# Patient Record
Sex: FEMALE | Race: WHITE | Employment: FULL TIME | URBAN - METROPOLITAN AREA
[De-identification: names, ages, dates, MRNs, and addresses within clinical notes are randomized per-mention and may not be internally consistent; named-entity substitution may affect disease eponyms.]

---

## 2017-12-18 ENCOUNTER — TELEPHONE (OUTPATIENT)
Dept: PULMONOLOGY | Age: 42
End: 2017-12-18

## 2017-12-18 ENCOUNTER — OFFICE VISIT (OUTPATIENT)
Dept: PULMONOLOGY | Age: 42
End: 2017-12-18

## 2017-12-18 VITALS
TEMPERATURE: 98.5 F | DIASTOLIC BLOOD PRESSURE: 84 MMHG | WEIGHT: 217 LBS | HEIGHT: 64 IN | RESPIRATION RATE: 18 BRPM | BODY MASS INDEX: 37.05 KG/M2 | HEART RATE: 101 BPM | SYSTOLIC BLOOD PRESSURE: 132 MMHG | OXYGEN SATURATION: 98 %

## 2017-12-18 DIAGNOSIS — R59.0 MEDIASTINAL ADENOPATHY: ICD-10-CM

## 2017-12-18 DIAGNOSIS — R06.02 SHORTNESS OF BREATH: ICD-10-CM

## 2017-12-18 DIAGNOSIS — R91.8 LUNG NODULES: ICD-10-CM

## 2017-12-18 PROCEDURE — 99245 OFF/OP CONSLTJ NEW/EST HI 55: CPT | Performed by: INTERNAL MEDICINE

## 2017-12-18 NOTE — ASSESSMENT & PLAN NOTE
The etiology is unclear. DDX includes granulomatous disease such as histoplasmosis, sarcoidosis or malignancy.   - Plan for bronchoscopy be as a first step  - And urine histo antigen and fungal serologies at next appointment

## 2017-12-18 NOTE — PROGRESS NOTES
No synovitis or joint deformity. No clubbing. Neuro: Cranial nerves are grossly intact. Moving all extremities. Motor and sensation grossly intact. Psych: Oriented x 3. No anxiety or agitation. DATA:      LABS:      No PFTs available for review today. IMAGING:      I personally reviewed and interpreted the following today in the office :       Chest CT(dated 12/4/17): Mediastinum: Multiple enlarged lymph nodes are identified.  The largest  conglomerate is in the right paratracheal space measuring 2.2 x 1.8 cm (image  number 36).  No axillary lymphadenopathy is identified.  Bilateral breast  prostheses noted. Lungs/pleura: Multiple suspicious-appearing nodules are identified within the  lungs bilaterally.  The largest is seen within the right lower lobe measuring  2.0 x 1.5 cm (image number 78). Another example nodule is in the medial aspect of the right lower lobe  measuring 6 x 6 mm (image number 63). Another example nodule is seen within the left lower lobe medially measuring  8 x 7 mm (image number 82). Numerous smaller nodules are detected, none of which appear to contain  calcification. ASSESSMENT AND PLAN:      Lung nodules  The etiology is unclear. DDX includes granulomatous disease such as histoplasmosis, sarcoidosis or malignancy. - Plan for bronchoscopy be as a first step  - And urine histo antigen and fungal serologies at next appointment    Mediastinal adenopathy  The etiology of the patient's adenopathy is unknown. DDx includes fungus, sarcoidosis or malignancy. We will plan for bronchoscopy with endobronchial ultrasound and fluoroscopy for diagnosis. Risk (including but not limited to bleeding, infection, collapsed lung and even death), benefit and options were explained to the patient. She wishes to proceed. Shortness of breath  MIld at present. - consider Pfts in future        The information above included the review and summarization of old records.   The history was obtained from these old records and from the patient directly. Risks, benefits and alternatives to the management plan were discussed with the patient.

## 2017-12-18 NOTE — ASSESSMENT & PLAN NOTE
The etiology of the patient's adenopathy is unknown. DDx includes fungus, sarcoidosis or malignancy. We will plan for bronchoscopy with endobronchial ultrasound and fluoroscopy for diagnosis. Risk (including but not limited to bleeding, infection, collapsed lung and even death), benefit and options were explained to the patient. She wishes to proceed.

## 2017-12-19 ENCOUNTER — HOSPITAL ENCOUNTER (OUTPATIENT)
Dept: SURGERY | Age: 42
Discharge: HOME | End: 2017-12-19
Admitting: INTERNAL MEDICINE

## 2017-12-19 VITALS
SYSTOLIC BLOOD PRESSURE: 120 MMHG | TEMPERATURE: 97.4 F | OXYGEN SATURATION: 97 % | HEART RATE: 73 BPM | DIASTOLIC BLOOD PRESSURE: 72 MMHG | HEIGHT: 64 IN | RESPIRATION RATE: 17 BRPM | BODY MASS INDEX: 36.7 KG/M2 | WEIGHT: 215 LBS

## 2017-12-19 LAB
GLUCOSE BLD-MCNC: 123 MG/DL (ref 70–99)
PERFORMED ON: ABNORMAL
PREGNANCY, URINE: NEGATIVE

## 2017-12-19 RX ORDER — HYDRALAZINE HYDROCHLORIDE 20 MG/ML
5 INJECTION INTRAMUSCULAR; INTRAVENOUS EVERY 10 MIN PRN
Status: DISCONTINUED | OUTPATIENT
Start: 2017-12-19 | End: 2017-12-20 | Stop reason: HOSPADM

## 2017-12-19 RX ORDER — PROMETHAZINE HYDROCHLORIDE 25 MG/ML
6.25 INJECTION, SOLUTION INTRAMUSCULAR; INTRAVENOUS
Status: ACTIVE | OUTPATIENT
Start: 2017-12-19 | End: 2017-12-19

## 2017-12-19 RX ORDER — HYDROMORPHONE HCL 110MG/55ML
0.5 PATIENT CONTROLLED ANALGESIA SYRINGE INTRAVENOUS EVERY 5 MIN PRN
Status: DISCONTINUED | OUTPATIENT
Start: 2017-12-19 | End: 2017-12-20 | Stop reason: HOSPADM

## 2017-12-19 RX ORDER — OXYCODONE HYDROCHLORIDE AND ACETAMINOPHEN 5; 325 MG/1; MG/1
1 TABLET ORAL PRN
Status: ACTIVE | OUTPATIENT
Start: 2017-12-19 | End: 2017-12-19

## 2017-12-19 RX ORDER — SODIUM CHLORIDE, SODIUM LACTATE, POTASSIUM CHLORIDE, CALCIUM CHLORIDE 600; 310; 30; 20 MG/100ML; MG/100ML; MG/100ML; MG/100ML
INJECTION, SOLUTION INTRAVENOUS ONCE
Status: DISCONTINUED | OUTPATIENT
Start: 2017-12-19 | End: 2017-12-20 | Stop reason: HOSPADM

## 2017-12-19 RX ORDER — ONDANSETRON 2 MG/ML
4 INJECTION INTRAMUSCULAR; INTRAVENOUS
Status: ACTIVE | OUTPATIENT
Start: 2017-12-19 | End: 2017-12-19

## 2017-12-19 RX ORDER — DIPHENHYDRAMINE HYDROCHLORIDE 50 MG/ML
12.5 INJECTION INTRAMUSCULAR; INTRAVENOUS
Status: ACTIVE | OUTPATIENT
Start: 2017-12-19 | End: 2017-12-19

## 2017-12-19 RX ORDER — OXYCODONE HYDROCHLORIDE AND ACETAMINOPHEN 5; 325 MG/1; MG/1
2 TABLET ORAL PRN
Status: ACTIVE | OUTPATIENT
Start: 2017-12-19 | End: 2017-12-19

## 2017-12-19 RX ORDER — MEPERIDINE HYDROCHLORIDE 25 MG/ML
12.5 INJECTION INTRAMUSCULAR; INTRAVENOUS; SUBCUTANEOUS EVERY 5 MIN PRN
Status: DISCONTINUED | OUTPATIENT
Start: 2017-12-19 | End: 2017-12-20 | Stop reason: HOSPADM

## 2017-12-19 RX ORDER — HYDROMORPHONE HCL 110MG/55ML
0.25 PATIENT CONTROLLED ANALGESIA SYRINGE INTRAVENOUS EVERY 5 MIN PRN
Status: DISCONTINUED | OUTPATIENT
Start: 2017-12-19 | End: 2017-12-20 | Stop reason: HOSPADM

## 2017-12-19 RX ORDER — LABETALOL HYDROCHLORIDE 5 MG/ML
5 INJECTION, SOLUTION INTRAVENOUS EVERY 10 MIN PRN
Status: DISCONTINUED | OUTPATIENT
Start: 2017-12-19 | End: 2017-12-20 | Stop reason: HOSPADM

## 2017-12-19 ASSESSMENT — PAIN - FUNCTIONAL ASSESSMENT: PAIN_FUNCTIONAL_ASSESSMENT: 0-10

## 2017-12-19 ASSESSMENT — ENCOUNTER SYMPTOMS: SHORTNESS OF BREATH: 1

## 2017-12-19 NOTE — PROGRESS NOTES
Recovery completed. Discharge instructions given to family. Pt and family verbalize understanding. Pt in wheelchair leaving recovery in stable condition for private auto.

## 2017-12-19 NOTE — H&P
There have been no significant changes since office visit yesterday. Nursing History and Physical reviewed and agreed upon. Allergies and medications have been reviewed. HENT: Airway patent and reviewed; Mallampatti 1  Cardiovascular: Normal rate, regular rhythm, normal heart sounds. Pulmonary/Chest: No wheezes. No rhonchi. No rales. Abdominal: Soft. Bowel sounds are normal. No distension. ASA CLASS     II. Mild systemic disease     Sedation plan:      Sedation per anesthesia     Post Procedure Plan   Return to same level of care   ______________________     The risks and benefits as well as alternatives to the procedure have been discussed with the patient. The patient understands and agrees to proceed.

## 2017-12-19 NOTE — ANESTHESIA PRE-OP
Department of Anesthesiology  Preprocedure Note       Name:  Anoop Chatman   Age:  38 y.o.  :  1975                                          MRN:  [id number]         Date:  2017      Surgeon:    Procedure:    Medications prior to admission:   Prior to Admission medications    Medication Sig Start Date End Date Taking? Authorizing Provider   lisdexamfetamine (VYVANSE) 70 MG capsule Take 70 mg by mouth every morning . Yes Historical Provider, MD   metFORMIN (GLUCOPHAGE) 1000 MG tablet Take 1,000 mg by mouth 2 times daily (with meals). Yes Historical Provider, MD   escitalopram (LEXAPRO) 10 MG tablet Take 10 mg by mouth daily. Yes Historical Provider, MD       Current medications:    Current Outpatient Prescriptions   Medication Sig Dispense Refill    lisdexamfetamine (VYVANSE) 70 MG capsule Take 70 mg by mouth every morning .  metFORMIN (GLUCOPHAGE) 1000 MG tablet Take 1,000 mg by mouth 2 times daily (with meals).  escitalopram (LEXAPRO) 10 MG tablet Take 10 mg by mouth daily. 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    Moderate ankle sprain S93.409A    Lung nodules R91.8    Mediastinal adenopathy R59.0    Shortness of breath R06.02       Past Medical History:        Diagnosis Date    Diabetes mellitus ([facility] [address]        Past Surgical History:        Procedure Laterality Date    BREAST SURGERY  10/06    bilat breast augmentation     SECTION  2009    x2  and     COLONOSCOPY  14    LASIK      ERICKA  4/98    dysplasia    NASAL SEPTUM SURGERY  1991    TONSILLECTOMY AND ADENOIDECTOMY  1991    WISDOM TOOTH EXTRACTION  7/96       Social History:    Social History   Substance Use Topics    Smoking status: Former Smoker     Packs/day: 0.25     Quit date: 6/20/2010    Smokeless tobacco: Never Used    Alcohol use Yes      Comment: rarely                                Counseling given: Not Answered      Vital Signs (Current):   Vitals:    12/19/17 0808   BP: (!) 172/87   Pulse: 74   Resp: 16   Temp: 98.5 °F (36.9 °C)   SpO2: 99%   Weight: 215 lb (97.5 kg)   Height: 5' 4\" (1.626 m)                                              BP Readings from Last 3 Encounters:   12/19/17 (!) 172/87   12/18/17 132/84   12/04/17 138/88       NPO Status: Time of last liquid consumption: 2000                        Time of last solid consumption: 1900                        Date of last liquid consumption: 12/18/17                        Date of last solid food consumption: 12/18/17    BMI:   Wt Readings from Last 3 Encounters:   12/19/17 215 lb (97.5 kg)   12/18/17 217 lb (98.4 kg)   12/04/17 207 lb (93.9 kg)     Body mass index is 36.9 kg/m². Anesthesia Evaluation     history of anesthetic complications: PONV. Airway: Mallampati: II  TM distance: >3 FB     Mouth opening: > = 3 FB Dental: normal exam         Pulmonary:   (+) shortness of breath:                            ROS comment: H/o tob, lung nodules   Cardiovascular:    (+) hyperlipidemia                  Neuro/Psych:               GI/Hepatic/Renal: Neg GI/Hepatic/Renal ROS            Endo/Other:    (+) Type II DM, , .                 Abdominal:           Vascular:                                     Pre-Operative Diagnosis: PULMONARY NODULE    38 y.o.   BMI:  Body mass index is 36.9 kg/m².      Vitals:    12/19/17 0808   BP: (!) 172/87   Pulse: 74   Resp: 16   Temp: 98.5 °F (36.9 °C)   SpO2: 99%   Weight: 215 lb (97.5 kg)   Height: 5' 4\" (1.626 m)       Allergies Allergen Reactions    Morphine Itching    Vicoprofen [Hydrocodone-Ibuprofen] Other (See Comments)     Severe stomach pains        Social History   Substance Use Topics    Smoking status: Former Smoker     Packs/day: 0.25     Quit date: 6/20/2010    Smokeless tobacco: Never Used    Alcohol use Yes      Comment: rarely       LABS:    CBC  Lab Results   Component Value Date/Time    WBC 12.1 (H) 12/04/2017 05:30 PM    HGB 14.7 12/04/2017 05:30 PM    HCT 43.2 12/04/2017 05:30 PM     12/04/2017 05:30 PM     RENAL  Lab Results   Component Value Date/Time     12/04/2017 05:30 PM    K 4.4 12/04/2017 05:30 PM    CL 98 (L) 12/04/2017 05:30 PM    CO2 24 12/04/2017 05:30 PM    BUN 9 12/04/2017 05:30 PM    CREATININE 0.6 12/04/2017 05:30 PM    GLUCOSE 145 (H) 12/04/2017 05:30 PM     COAGS  No results found for: PROTIME, INR, APTT     Anesthesia Plan      general     ASA 3     (Pt agrees to risks, benefits and alternatives of GETA. Questions answered. Willing to proceed with plan.)  Induction: intravenous. Anesthetic plan and risks discussed with patient.                       Aliya Smart MD   12/19/2017

## 2017-12-19 NOTE — PROCEDURES
PROCEDURE: Fiberoptic bronchoscopy with endobronchial ultrasound-guided biopsy of lymph nodes    DESCRIPTION OF PROCEDURE: Informed consent was obtained from the patient after explaining the risks and benefits. A time out was taken. General anesthesia was kindly provided by the anesthetist.     The scope was passed with ease via the LMA. Direct visualization of the lymph nodes was obtained using endobronchial ultrasound (EBUS) guidance. A complete ultrasound lymph node exam was performed for lymph node stations 2, 4, 7, 10 and 11. The following lymph nodes were subjected to EBUS guided biopsy using standard technique and in the following order:    1.  2R (approximately 1.5 cm in short axis)  2.  4R (approximately 1.5 cm in short axis)  3.  7 (approximately 1.25 cm in short axis)  4. 10R (approximately 1 cm in short axis)    Subsequently, a standard bronchoscope was used to perform a complete airway inspection. There were no endobronchial masses identified, the mucosa was somewhat erythematous and inflamed appearing. 1.  Washings were obtained from throughout the airways. 2.  Brushings were obtained in the region RLL posterior subsegment. 3.  A bronchoalveolar lavage was obtained from the RLL. The patient tolerated the procedure well. Recovery will be per the anesthesia team.   Estimated blood loss <5ml. FOLLOW UP:  is with me in the office. Patient is instructed to call with concerns. In the event of severe symptoms or if the patient is unable to reach my office, instructions are given to proceed to the emergency department.

## 2017-12-21 LAB
CULTURE, RESPIRATORY: NORMAL
CULTURE, RESPIRATORY: NORMAL
GRAM STAIN RESULT: NORMAL
GRAM STAIN RESULT: NORMAL

## 2018-01-05 ENCOUNTER — OFFICE VISIT (OUTPATIENT)
Dept: PULMONOLOGY | Age: 43
End: 2018-01-05

## 2018-01-05 ENCOUNTER — TELEPHONE (OUTPATIENT)
Dept: DERMATOLOGY | Age: 43
End: 2018-01-05

## 2018-01-05 ENCOUNTER — HOSPITAL ENCOUNTER (OUTPATIENT)
Dept: OTHER | Age: 43
Discharge: HOME | End: 2018-01-05
Attending: INTERNAL MEDICINE | Admitting: INTERNAL MEDICINE

## 2018-01-05 VITALS
SYSTOLIC BLOOD PRESSURE: 132 MMHG | WEIGHT: 215 LBS | TEMPERATURE: 98 F | RESPIRATION RATE: 18 BRPM | HEART RATE: 95 BPM | HEIGHT: 64 IN | DIASTOLIC BLOOD PRESSURE: 90 MMHG | BODY MASS INDEX: 36.7 KG/M2 | OXYGEN SATURATION: 98 %

## 2018-01-05 DIAGNOSIS — R91.8 LUNG NODULES: Primary | ICD-10-CM

## 2018-01-05 DIAGNOSIS — L98.9 SKIN LESION OF NECK: ICD-10-CM

## 2018-01-05 DIAGNOSIS — R59.0 MEDIASTINAL ADENOPATHY: ICD-10-CM

## 2018-01-05 DIAGNOSIS — R06.02 SHORTNESS OF BREATH: ICD-10-CM

## 2018-01-05 PROCEDURE — 99214 OFFICE O/P EST MOD 30 MIN: CPT | Performed by: INTERNAL MEDICINE

## 2018-01-05 NOTE — PROGRESS NOTES
CHIEF COMPLAINT:  Abnormal chest CT    Patient is being seen at the request of Dr. Vanita Humphries for a consultation for an abnormal chest CT    HPI: Presenting hx: She is a 29-year-old woman with a past medical history of diabetes mellitus who went to the emergency room recently for abdominal discomfort and underwent CT scan. CT scan showed bilateral lung nodules with mediastinal adenopathy. The patient reports some shortness of breath with exertion. She is a former smoker. She smoked 1.5 ppd x 12 years, last was 6 months ago. She denies any associated hemoptysis. SHe has a hx of endometriosis. She had a skin lesion on her R neck in last few months. No family hx of lung cancer. She had some second hand smoke exposure growing up. She works in an office setting. Her  is a . No known asbestos exposure. Sister was diagnosed with histoplasmosis. Since last clinic visit, the patient reports she has been doing ok. She has a slight cough with a recent URI. She notes 2 papules on her neck have become inflamed. REVIEW OF SYSTEMS:    CONSTITUTIONAL: Negative for fevers and chills  EYES: no conjunctival injection, no redness or drainage  ENT: Negative for oropharyngeal exudate, post nasal drip, sinus pain / pressure, + nasal congestion, no oral ulcerations  RESPIRATORY:  No hemoptysis or pleuritic pain. CARDIOVASCULAR: + for chest pain, palpitations, PND  GASTROINTESTINAL: Negative for nausea, vomiting, diarrhea, constipation and abdominal pain  MUSCULOSKELETAL:  No arthralgias/arthritis or muscle weakness  HEMATOLOGICAL/LYMPH: Negative for adenopathy  SKIN:  No rash or nodules  EXTREMITIES: Negative for cyanosis or edema.   NEUROLOGICAL: Negative for unilateral weakness, speech or gait abnormalities; + back pain    Past Medical History:   Diagnosis Date    Diabetes mellitus ([address]        Past Surgical History:        Procedure Laterality Date    BREAST SURGERY  10/06    bilat breast augmentation    sparse histiocytes surrounded by a thin fibrous band. The  possibility this could be a component of an old fibro-caseous granuloma  is considered. Special stains for fungi and mycobacteria are pending and  a supplemental report will be issued. .   AJDO/JOSEPHINE  12/20/2017    Flow cytometry and cx are negative to date  IMAGING:      I personally reviewed and interpreted the following today in the office :       Chest CT(dated 12/4/17): Mediastinum: Multiple enlarged lymph nodes are identified.  The largest  conglomerate is in the right paratracheal space measuring 2.2 x 1.8 cm (image  number 36).  No axillary lymphadenopathy is identified.  Bilateral breast  prostheses noted. Lungs/pleura: Multiple suspicious-appearing nodules are identified within the  lungs bilaterally.  The largest is seen within the right lower lobe measuring  2.0 x 1.5 cm (image number 78). Another example nodule is in the medial aspect of the right lower lobe  measuring 6 x 6 mm (image number 63). Another example nodule is seen within the left lower lobe medially measuring  8 x 7 mm (image number 82). Numerous smaller nodules are detected, none of which appear to contain  calcification. ASSESSMENT AND PLAN:  Lung nodules  The etiology is unclear. DDX includes granulomatous disease such as histoplasmosis, sarcoidosis or less likely malignancy. - Bronchoscopy did reveal etiology  - send urine histo antigen and fungal serologies   - refer to dermatology for possible biopsy of skin lesions     Mediastinal adenopathy  The etiology of the patient's adenopathy is unknown. DDx includes fungus, sarcoidosis or malignancy. No evidence of malignancy on EBUS guided biopsies of LNs.  - plan for repeat chest CT in 4 months     Shortness of breath  MIld at present.   - consider Pfts in future

## 2018-01-07 LAB
HISTOPLASMA ANTIGEN URINE INTERP: NOT DETECTED
HISTOPLASMA ANTIGEN URINE: NOT DETECTED NG/ML

## 2018-01-08 LAB
HISTOPLASMA ANTIBODY MYCELIAL CF: NORMAL
HISTOPLASMA ANTIBODY YEAST CF: NORMAL

## 2018-01-15 ENCOUNTER — OFFICE VISIT (OUTPATIENT)
Dept: DERMATOLOGY | Age: 43
End: 2018-01-15

## 2018-01-15 DIAGNOSIS — D48.5 NEOPLASM OF UNCERTAIN BEHAVIOR OF SKIN: Primary | ICD-10-CM

## 2018-01-15 PROCEDURE — 11100 PR BIOPSY OF SKIN LESION: CPT | Performed by: DERMATOLOGY

## 2018-01-15 PROCEDURE — 11101 PR BIOPSY, EACH ADDED LESION: CPT | Performed by: DERMATOLOGY

## 2018-01-15 NOTE — PROGRESS NOTES
[facility]) Dermatology  Kamryn Vera      [address]  1975    38 y.o. female     Date of Visit: 1/15/2018    Chief Complaint / Reason for Referral: Lesion     I was asked to see this patient by Dr. Hernán Corona     History of Present Illness:  1. Approx 9mo hx persistent asymptomatic lesion of R side of neck. Started initially as what pt thought was a pimple. Admits to manipulating lesion and states that it has since never cleared.   -A few mo ago, developed another similar pimple-type lesion just below the 1st one. Mild associated pain.   -No similar lesions elsewhere on body.   -Undergoing workup for lung nodules and mediastinal DEON - ?sarcoidosis     Review of Systems:  Constitutional: Reports general sense of well-being. Heme: Denies abnormal bleeding/bruising. Past Medical History, Surgical History, Family History, Medications and Allergies reviewed. Past Medical History:   Diagnosis Date    Diabetes mellitus [facility])      Past Surgical History:   Procedure Laterality Date    BREAST SURGERY  10/06    bilat breast augmentation    BRONCHOSCOPY  2017    EBUS and Bronch for Abnormal CT Scan     SECTION  2009    x2  and     COLONOSCOPY  14    LASIK      LEEP      dysplasia    NASAL SEPTUM SURGERY      TONSILLECTOMY AND ADENOIDECTOMY  1991    WISDOM TOOTH EXTRACTION         Allergies   Allergen Reactions    Morphine Itching    Vicoprofen [Hydrocodone-Ibuprofen] Other (See Comments)     Severe stomach pains      Outpatient Prescriptions Marked as Taking for the 1/15/18 encounter (Office Visit) with Shweta aLy MD   Medication Sig Dispense Refill    lisdexamfetamine (VYVANSE) 70 MG capsule Take 70 mg by mouth every morning .  metFORMIN (GLUCOPHAGE) 1000 MG tablet Take 1,000 mg by mouth 2 times daily (with meals).  escitalopram (LEXAPRO) 10 MG tablet Take 10 mg by mouth daily.          Social History: 18yo, 10yo

## 2018-01-16 LAB
ASPERGILLUS ANTIBODY ID: NORMAL
BLASTOMYCES ANTIBODY ID: NORMAL
COCCIDIOIDES ANTIBODY ID: NORMAL
HISTOPLASMA ABS, ID: NORMAL

## 2018-01-18 ENCOUNTER — TELEPHONE (OUTPATIENT)
Dept: DERMATOLOGY | Age: 43
End: 2018-01-18

## 2018-01-22 LAB
FUNGUS (MYCOLOGY) CULTURE: NORMAL
FUNGUS STAIN: NORMAL

## 2018-02-06 LAB
AFB CULTURE (MYCOBACTERIA): NORMAL
AFB SMEAR: NORMAL

## 2018-03-05 ENCOUNTER — OFFICE VISIT (OUTPATIENT)
Dept: PULMONOLOGY | Age: 43
End: 2018-03-05

## 2018-03-05 VITALS
RESPIRATION RATE: 16 BRPM | TEMPERATURE: 98.1 F | HEIGHT: 64 IN | WEIGHT: 212 LBS | HEART RATE: 81 BPM | SYSTOLIC BLOOD PRESSURE: 134 MMHG | OXYGEN SATURATION: 97 % | BODY MASS INDEX: 36.19 KG/M2 | DIASTOLIC BLOOD PRESSURE: 82 MMHG

## 2018-03-05 DIAGNOSIS — R91.8 LUNG NODULES: Primary | ICD-10-CM

## 2018-03-05 DIAGNOSIS — R06.02 SHORTNESS OF BREATH: ICD-10-CM

## 2018-03-05 DIAGNOSIS — R59.0 MEDIASTINAL ADENOPATHY: ICD-10-CM

## 2018-03-05 PROCEDURE — 99213 OFFICE O/P EST LOW 20 MIN: CPT | Performed by: INTERNAL MEDICINE

## 2018-03-05 NOTE — PROGRESS NOTES
macrophages present  COMMENT:  The cell block prepared from the fine needle aspiration of the right  hilum (D) shows a single small (0.5 mm) fragment of acellular debris  rimmed by sparse histiocytes surrounded by a thin fibrous band. The  possibility this could be a component of an old fibro-caseous granuloma  is considered. Special stains for fungi and mycobacteria are pending and  a supplemental report will be issued. .   AJDO/JOSEPHINE  12/20/2017    Flow cytometry and cx are negative to date  IMAGING:      I personally reviewed and interpreted the following today in the office :       Chest CT(dated 12/4/17): Mediastinum: Multiple enlarged lymph nodes are identified.  The largest  conglomerate is in the right paratracheal space measuring 2.2 x 1.8 cm (image  number 36).  No axillary lymphadenopathy is identified.  Bilateral breast  prostheses noted. Lungs/pleura: Multiple suspicious-appearing nodules are identified within the  lungs bilaterally.  The largest is seen within the right lower lobe measuring  2.0 x 1.5 cm (image number 78). Another example nodule is in the medial aspect of the right lower lobe  measuring 6 x 6 mm (image number 63). Another example nodule is seen within the left lower lobe medially measuring  8 x 7 mm (image number 82). Numerous smaller nodules are detected, none of which appear to contain  calcification. ASSESSMENT AND PLAN:  Lung nodules  The etiology is unclear. DDX includes granulomatous disease such as histoplasmosis, sarcoidosis or less likely malignancy. - Bronchoscopy did not reveal etiology  - sent urine histo antigen and fungal serologies - all negative  - referred to dermatology for biopsy of skin lesions- no granulomas     Mediastinal adenopathy  The etiology of the patient's adenopathy is unknown. DDx includes fungus, sarcoidosis or malignancy. No evidence of malignancy on EBUS guided biopsies of LNs.    - plan for repeat chest CT at 4 months (5/18)     Shortness of

## 2018-04-28 NOTE — LETTER
[facility] Pulmonary, Critical Care, and Sleep  [address], [address] [place] [address]  Phone: [phone]  Fax: [phone]    Chriss Jaquez MD    December 18, 2017     Patient: Renzo Chandra   MR Number: [id number]   YOB: 1975   Date of Visit: 12/18/2017     Dear Dr. Gricel Rodriguez: Thank you for the request for consultation for Arden Felix to me for the evaluation of an abnormal chest CT. Below are the relevant portions of my assessment and plan of care. Lung nodules  The etiology is unclear. DDX includes granulomatous disease such as histoplasmosis, sarcoidosis or malignancy. - Plan for bronchoscopy be as a first step  - And urine histo antigen and fungal serologies at next appointment    Mediastinal adenopathy  The etiology of the patient's adenopathy is unknown. DDx includes fungus, sarcoidosis or malignancy. We will plan for bronchoscopy with endobronchial ultrasound and fluoroscopy for diagnosis. Risk (including but not limited to bleeding, infection, collapsed lung and even death), benefit and options were explained to the patient. She wishes to proceed. Shortness of breath  MIld at present. - consider Pfts in future     If you have questions, please do not hesitate to call me. I look forward to following Marquez Freed along with you.     Sincerely,        Keyla Brown MD
Discharge teaching done by MD Ramires

## 2018-05-07 ENCOUNTER — HOSPITAL ENCOUNTER (OUTPATIENT)
Dept: CT IMAGING | Age: 43
Discharge: HOME | End: 2018-05-07
Attending: INTERNAL MEDICINE | Admitting: INTERNAL MEDICINE

## 2018-05-07 DIAGNOSIS — R91.8 LUNG NODULES: ICD-10-CM

## 2018-05-09 ENCOUNTER — OFFICE VISIT (OUTPATIENT)
Dept: PULMONOLOGY | Age: 43
End: 2018-05-09

## 2018-05-09 VITALS
SYSTOLIC BLOOD PRESSURE: 136 MMHG | BODY MASS INDEX: 35.17 KG/M2 | TEMPERATURE: 98.3 F | DIASTOLIC BLOOD PRESSURE: 86 MMHG | RESPIRATION RATE: 18 BRPM | HEART RATE: 79 BPM | HEIGHT: 64 IN | WEIGHT: 206 LBS | OXYGEN SATURATION: 98 %

## 2018-05-09 DIAGNOSIS — R06.02 SHORTNESS OF BREATH: ICD-10-CM

## 2018-05-09 DIAGNOSIS — R59.0 MEDIASTINAL ADENOPATHY: ICD-10-CM

## 2018-05-09 DIAGNOSIS — R91.8 LUNG NODULES: Primary | ICD-10-CM

## 2018-05-09 PROCEDURE — 99214 OFFICE O/P EST MOD 30 MIN: CPT | Performed by: INTERNAL MEDICINE

## 2018-08-18 ENCOUNTER — HOSPITAL ENCOUNTER (EMERGENCY)
Age: 43
Discharge: HOME | End: 2018-08-18
Attending: EMERGENCY MEDICINE
Payer: COMMERCIAL

## 2018-08-18 VITALS
TEMPERATURE: 97.8 F | HEIGHT: 64 IN | BODY MASS INDEX: 32.78 KG/M2 | SYSTOLIC BLOOD PRESSURE: 144 MMHG | HEART RATE: 100 BPM | RESPIRATION RATE: 18 BRPM | DIASTOLIC BLOOD PRESSURE: 85 MMHG | WEIGHT: 192 LBS | OXYGEN SATURATION: 100 %

## 2018-08-18 DIAGNOSIS — R51.9 NONINTRACTABLE EPISODIC HEADACHE, UNSPECIFIED HEADACHE TYPE: Primary | ICD-10-CM

## 2018-08-18 PROCEDURE — 96375 TX/PRO/DX INJ NEW DRUG ADDON: CPT

## 2018-08-18 PROCEDURE — 96374 THER/PROPH/DIAG INJ IV PUSH: CPT

## 2018-08-18 PROCEDURE — 99283 EMERGENCY DEPT VISIT LOW MDM: CPT

## 2018-08-18 PROCEDURE — 6360000002 HC RX W HCPCS: Performed by: EMERGENCY MEDICINE

## 2018-08-18 RX ORDER — DIPHENHYDRAMINE HYDROCHLORIDE 50 MG/ML
50 INJECTION INTRAMUSCULAR; INTRAVENOUS ONCE
Status: COMPLETED | OUTPATIENT
Start: 2018-08-18 | End: 2018-08-18

## 2018-08-18 RX ORDER — METOCLOPRAMIDE HYDROCHLORIDE 5 MG/ML
10 INJECTION INTRAMUSCULAR; INTRAVENOUS ONCE
Status: COMPLETED | OUTPATIENT
Start: 2018-08-18 | End: 2018-08-18

## 2018-08-18 RX ORDER — BUTALBITAL, ACETAMINOPHEN AND CAFFEINE 300; 40; 50 MG/1; MG/1; MG/1
1 CAPSULE ORAL EVERY 6 HOURS PRN
Qty: 20 CAPSULE | Refills: 0 | Status: SHIPPED | OUTPATIENT
Start: 2018-08-18 | End: 2019-06-24 | Stop reason: ALTCHOICE

## 2018-08-18 RX ADMIN — DIPHENHYDRAMINE HYDROCHLORIDE 50 MG: 50 INJECTION, SOLUTION INTRAMUSCULAR; INTRAVENOUS at 19:45

## 2018-08-18 RX ADMIN — METOCLOPRAMIDE 10 MG: 5 INJECTION, SOLUTION INTRAMUSCULAR; INTRAVENOUS at 19:45

## 2018-08-18 ASSESSMENT — PAIN DESCRIPTION - FREQUENCY: FREQUENCY: CONTINUOUS

## 2018-08-18 ASSESSMENT — PAIN DESCRIPTION - PAIN TYPE: TYPE: ACUTE PAIN

## 2018-08-18 ASSESSMENT — PAIN DESCRIPTION - DESCRIPTORS: DESCRIPTORS: ACHING

## 2018-08-18 ASSESSMENT — PAIN SCALES - GENERAL: PAINLEVEL_OUTOF10: 8

## 2018-08-18 ASSESSMENT — PAIN DESCRIPTION - LOCATION: LOCATION: HEAD

## 2018-08-20 ENCOUNTER — HOSPITAL ENCOUNTER (EMERGENCY)
Age: 43
Discharge: HOME | End: 2018-08-20
Attending: EMERGENCY MEDICINE
Payer: COMMERCIAL

## 2018-08-20 VITALS
HEIGHT: 64 IN | RESPIRATION RATE: 18 BRPM | OXYGEN SATURATION: 100 % | SYSTOLIC BLOOD PRESSURE: 137 MMHG | TEMPERATURE: 98.2 F | HEART RATE: 94 BPM | WEIGHT: 190 LBS | DIASTOLIC BLOOD PRESSURE: 89 MMHG | BODY MASS INDEX: 32.44 KG/M2

## 2018-08-20 DIAGNOSIS — S13.4XXD WHIPLASH INJURY TO NECK, SUBSEQUENT ENCOUNTER: ICD-10-CM

## 2018-08-20 DIAGNOSIS — G44.209 TENSION HEADACHE: Primary | ICD-10-CM

## 2018-08-20 PROCEDURE — 99283 EMERGENCY DEPT VISIT LOW MDM: CPT

## 2018-08-20 PROCEDURE — 96372 THER/PROPH/DIAG INJ SC/IM: CPT

## 2018-08-20 PROCEDURE — 6360000002 HC RX W HCPCS: Performed by: EMERGENCY MEDICINE

## 2018-08-20 RX ORDER — KETOROLAC TROMETHAMINE 30 MG/ML
30 INJECTION, SOLUTION INTRAMUSCULAR; INTRAVENOUS ONCE
Status: COMPLETED | OUTPATIENT
Start: 2018-08-20 | End: 2018-08-20

## 2018-08-20 RX ORDER — DEXAMETHASONE SODIUM PHOSPHATE 10 MG/ML
10 INJECTION INTRAMUSCULAR; INTRAVENOUS ONCE
Status: COMPLETED | OUTPATIENT
Start: 2018-08-20 | End: 2018-08-20

## 2018-08-20 RX ADMIN — DEXAMETHASONE SODIUM PHOSPHATE 10 MG: 10 INJECTION, SOLUTION INTRAMUSCULAR; INTRAVENOUS at 20:25

## 2018-08-20 RX ADMIN — KETOROLAC TROMETHAMINE 30 MG: 30 INJECTION, SOLUTION INTRAMUSCULAR at 20:25

## 2018-08-20 ASSESSMENT — PAIN DESCRIPTION - PAIN TYPE: TYPE: ACUTE PAIN

## 2018-08-20 ASSESSMENT — PAIN SCALES - GENERAL
PAINLEVEL_OUTOF10: 8
PAINLEVEL_OUTOF10: 8

## 2018-08-21 NOTE — ED PROVIDER NOTES
Emergency [facility] ED    Patient: Christiana Davila  MRN: [id number]  : 1975  Date of Evaluation: 2018  ED Provider: Johnny Lock DO    Chief Complaint       Chief Complaint   Patient presents with    Headache     pt c/o headache that started on wednesday. Pt went to [facility] put on migraine medicine. Pt states, \" headache never went away and is now going to neck and ears. \"      Alyse Ko is a 41 y.o. female who presents to the emergency department For chief complaint of headache. Patient states that 5 days ago she tripped and fell against the side of a tub, snapping her neck forward. She did not hit her head or lose consciousness, denies being on blood thinners. A few days later the neck pain persisted on the right side primarily radiating up into her head causing a headache. She was seen at [facility], given a headache cocktail and discharged home. Patient states that the Fioricet she was sent home with is not helping. She has been taking naproxen as well. Otherwise she denies any other significant symptoms such as headache, visual changes, neck stiffness, numbness or tingling, chest pain, shortness breath, nausea, vomiting, abdominal pain, changes in bowel movements or urinary symptoms. ROS:     Review of Systems   All other systems reviewed and are negative.       Past History     Past Medical History:   Diagnosis Date    Diabetes mellitus [facility])      Past Surgical History:   Procedure Laterality Date    BREAST SURGERY  10/06    bilat breast augmentation    BRONCHOSCOPY  2017    EBUS and Bronch for Abnormal CT Scan     SECTION  2009    x2  and     COLONOSCOPY  14    LASIK      LEEP      dysplasia    NASAL SEPTUM SURGERY  1991    TONSILLECTOMY AND ADENOIDECTOMY  1991    WISDOM TOOTH EXTRACTION       Social History     Social History    Marital status:      Spouse name: N/A    Number of children: N/A    Years of education: N/A     Social History Main Topics    Smoking status: Current Every Day Smoker     Packs/day: 0.25     Last attempt to quit: 6/20/2010    Smokeless tobacco: Never Used    Alcohol use Yes      Comment: rarely    Drug use: No    Sexual activity: Yes     Partners: Male     Other Topics Concern    None     Social History Narrative    None       Medications/Allergies     Previous Medications    BUTALBITAL-APAP-CAFFEINE (FIORICET) -40 MG CAPS PER CAPSULE    Take 1 capsule by mouth every 6 hours as needed for Headaches    ESCITALOPRAM (LEXAPRO) 10 MG TABLET    Take 10 mg by mouth daily. LISDEXAMFETAMINE (VYVANSE) 70 MG CAPSULE    Take 70 mg by mouth every morning . METFORMIN (GLUCOPHAGE) 1000 MG TABLET    Take 1,000 mg by mouth 2 times daily (with meals). Allergies   Allergen Reactions    Morphine Itching    Vicoprofen [Hydrocodone-Ibuprofen] Other (See Comments)     Severe stomach pains         Physical Exam       ED Triage Vitals [08/20/18 1907]   BP Temp Temp src Pulse Resp SpO2 Height Weight   (!) 144/90 98.2 °F (36.8 °C) -- 108 18 100 % 5' 4\" (1.626 m) 190 lb (86.2 kg)     Physical Exam   Constitutional: She is oriented to person, place, and time. She appears well-developed and well-nourished. No distress. Patient is sitting up in bed, talking normally appropriate. She is not appear to be in acute discomfort or distress. She is ranging her head spontaneously in all directions without significant difficulty or discomfort. HENT:   Head: Normocephalic and atraumatic. Mouth/Throat: Oropharynx is clear and moist.   Eyes: Pupils are equal, round, and reactive to light. EOM are normal. Right eye exhibits no discharge. Left eye exhibits no discharge. Neck: Normal range of motion and full passive range of motion without pain. Neck supple. Muscular tenderness present. No spinous process tenderness present.  No tracheal deviation and normal range of motion present. Cardiovascular: Normal rate, regular rhythm, normal heart sounds and intact distal pulses. Exam reveals no gallop and no friction rub. No murmur heard. Pulmonary/Chest: Effort normal and breath sounds normal. No stridor. No respiratory distress. She has no wheezes. She has no rales. She exhibits no tenderness. Abdominal: Soft. She exhibits no distension. There is no tenderness. There is no rebound and no guarding. Musculoskeletal: Normal range of motion. She exhibits no edema, tenderness or deformity. Neurological: She is alert and oriented to person, place, and time. No cranial nerve deficit. Coordination normal.   Skin: Skin is warm and dry. No rash noted. She is not diaphoretic. No erythema. No pallor. Psychiatric: She has a normal mood and affect. Nursing note and vitals reviewed. Diagnostics   Labs:  No results found for this visit on 08/20/18. Radiographs:  No results found. Procedures/EKG:   Procedures    ED Course and MDM           In brief, Lasha Zepeda is a 41 y.o. female who presented to the emergency department With history and physical exam consistent with whiplash injury. Patient has musculoskeletal tenderness on exam, however she also has full range of motion. I have low suspicion for cervical spine fracture or other significant injury given the fact that she is spontaneously ranging her neck in full range without guarding or significant discomfort. She has no focal neurologic deficits. Her headache does appear to be secondary to her muscle strain and not from her actual head itself. Given her current exam, we did discuss treatment options, and at this point patient is declining IV or other workup. We will elected to go with Toradol and Decadron for her symptoms as well as cervical strain exercises and close PCP follow-up. We did discuss strict return precautions for the emergency room.   She agrees with the plan at this time as no further concerns or questions. ED Medication Orders     Start Ordered     Status Ordering Provider    08/20/18 2030 08/20/18 2018  ketorolac (TORADOL) injection 30 mg  ONCE      Ordered Erica CARTWRIGHT    08/20/18 2030 08/20/18 2018  dexamethasone (DECADRON) injection 10 mg  ONCE      Ordered Zari King          Final Impression      1. Tension headache    2.  Whiplash injury to neck, subsequent encounter      DISPOSITION Discharge - Pending Orders Complete   (Please note that portions of this note may have been completed with a voice recognition program. Efforts were made to edit the dictations but occasionally words are mis-transcribed.)    Fernando Rodriguez,   [facility], DO  08/20/18 2024

## 2018-08-22 ENCOUNTER — APPOINTMENT (OUTPATIENT)
Dept: CT IMAGING | Age: 43
DRG: 810 | End: 2018-08-22
Payer: COMMERCIAL

## 2018-08-22 ENCOUNTER — HOSPITAL ENCOUNTER (INPATIENT)
Age: 43
LOS: 3 days | Discharge: HOME | DRG: 810 | End: 2018-08-25
Attending: INTERNAL MEDICINE | Admitting: INTERNAL MEDICINE
Payer: COMMERCIAL

## 2018-08-22 DIAGNOSIS — R51.9 ACUTE NONINTRACTABLE HEADACHE, UNSPECIFIED HEADACHE TYPE: ICD-10-CM

## 2018-08-22 DIAGNOSIS — D72.829 LEUKOCYTOSIS, UNSPECIFIED TYPE: ICD-10-CM

## 2018-08-22 DIAGNOSIS — D64.9 ANEMIA, UNSPECIFIED TYPE: ICD-10-CM

## 2018-08-22 DIAGNOSIS — D59.11 WARM ANTIBODY HEMOLYTIC ANEMIA (HCC): Primary | ICD-10-CM

## 2018-08-22 DIAGNOSIS — R42 LIGHTHEADEDNESS: ICD-10-CM

## 2018-08-22 DIAGNOSIS — M54.2 NECK PAIN: ICD-10-CM

## 2018-08-22 DIAGNOSIS — R06.02 SOB (SHORTNESS OF BREATH): ICD-10-CM

## 2018-08-22 PROBLEM — Z78.9 ALCOHOL USE: Status: ACTIVE | Noted: 2018-08-22

## 2018-08-22 PROBLEM — E11.9 TYPE 2 DIABETES MELLITUS WITHOUT COMPLICATION, WITHOUT LONG-TERM CURRENT USE OF INSULIN (HCC): Status: ACTIVE | Noted: 2018-08-22

## 2018-08-22 PROBLEM — E04.1 THYROID NODULE: Status: ACTIVE | Noted: 2018-08-22

## 2018-08-22 PROBLEM — E80.6 HYPERBILIRUBINEMIA: Status: ACTIVE | Noted: 2018-08-22

## 2018-08-22 PROBLEM — F17.200 CURRENT SMOKER: Status: ACTIVE | Noted: 2018-08-22

## 2018-08-22 PROBLEM — D75.89 MACROCYTOSIS: Status: ACTIVE | Noted: 2018-08-22

## 2018-08-22 LAB
A/G RATIO: 1.9 (ref 1.1–2.2)
ABO/RH: NORMAL
ALBUMIN SERPL-MCNC: 4.2 G/DL (ref 3.4–5)
ALP BLD-CCNC: 113 U/L (ref 40–129)
ALT SERPL-CCNC: 19 U/L (ref 10–40)
ANION GAP SERPL CALCULATED.3IONS-SCNC: 15 MMOL/L (ref 3–16)
ANISOCYTOSIS: ABNORMAL
ANTIBODY SCREEN: NORMAL
APTT: 23.9 SEC (ref 26–36)
AST SERPL-CCNC: 17 U/L (ref 15–37)
BACTERIA: ABNORMAL /HPF
BANDED NEUTROPHILS RELATIVE PERCENT: 3 % (ref 0–7)
BASOPHILS ABSOLUTE: 0 K/UL (ref 0–0.2)
BASOPHILS RELATIVE PERCENT: 0 %
BILIRUB SERPL-MCNC: 2.2 MG/DL (ref 0–1)
BILIRUBIN URINE: ABNORMAL
BLOOD, URINE: NEGATIVE
BUN BLDV-MCNC: 21 MG/DL (ref 7–20)
CALCIUM SERPL-MCNC: 9 MG/DL (ref 8.3–10.6)
CASTS: ABNORMAL /LPF
CHLORIDE BLD-SCNC: 99 MMOL/L (ref 99–110)
CLARITY: CLEAR
CO2: 22 MMOL/L (ref 21–32)
COLOR: YELLOW
CREAT SERPL-MCNC: <0.5 MG/DL (ref 0.6–1.1)
DAT POLYSPECIFIC: NORMAL
EOSINOPHILS ABSOLUTE: 0.7 K/UL (ref 0–0.6)
EOSINOPHILS RELATIVE PERCENT: 2 %
EPITHELIAL CELLS, UA: ABNORMAL /HPF
FERRITIN: 272.9 NG/ML (ref 15–150)
FERRITIN: 319.2 NG/ML (ref 15–150)
FOLATE: 8.58 NG/ML (ref 4.78–24.2)
GFR AFRICAN AMERICAN: >60
GFR NON-AFRICAN AMERICAN: >60
GLOBULIN: 2.2 G/DL
GLUCOSE BLD-MCNC: 134 MG/DL (ref 70–99)
GLUCOSE BLD-MCNC: 146 MG/DL (ref 70–99)
GLUCOSE BLD-MCNC: 169 MG/DL (ref 70–99)
GLUCOSE BLD-MCNC: 175 MG/DL (ref 70–99)
GLUCOSE URINE: NEGATIVE MG/DL
HAPTOGLOBIN: <10 MG/DL (ref 30–200)
HBV SURFACE AB TITR SER: <3.5 MIU/ML
HCG QUALITATIVE: NEGATIVE
HCT VFR BLD CALC: 18.4 % (ref 36–48)
HCT VFR BLD CALC: 20.1 % (ref 36–48)
HEMATOLOGY PATH CONSULT: YES
HEMOGLOBIN: 6.6 G/DL (ref 12–16)
HEPATITIS B SURFACE ANTIGEN INTERPRETATION: NORMAL
HEPATITIS C ANTIBODY INTERPRETATION: NORMAL
HYPOCHROMIA: ABNORMAL
IMMATURE RETIC FRACT: 0.75 (ref 0.21–0.37)
INR BLD: 1.03 (ref 0.86–1.14)
IRON SATURATION: 68 % (ref 15–50)
IRON SATURATION: 73 % (ref 15–50)
IRON: 209 UG/DL (ref 37–145)
IRON: 221 UG/DL (ref 37–145)
KETONES, URINE: NEGATIVE MG/DL
LACTATE DEHYDROGENASE: 441 U/L (ref 100–190)
LEUKOCYTE ESTERASE, URINE: ABNORMAL
LYMPHOCYTES ABSOLUTE: 7.2 K/UL (ref 1–5.1)
LYMPHOCYTES RELATIVE PERCENT: 22 %
MACROCYTES: ABNORMAL
MCH RBC QN AUTO: 37.7 PG (ref 26–34)
MCHC RBC AUTO-ENTMCNC: 33 G/DL (ref 31–36)
MCV RBC AUTO: 114.1 FL (ref 80–100)
MICROCYTES: ABNORMAL
MICROSCOPIC EXAMINATION: YES
MONOCYTES ABSOLUTE: 0.7 K/UL (ref 0–1.3)
MONOCYTES RELATIVE PERCENT: 2 %
NEUTROPHILS ABSOLUTE: 24.1 K/UL (ref 1.7–7.7)
NEUTROPHILS RELATIVE PERCENT: 71 %
NITRITE, URINE: NEGATIVE
NUCLEATED RED BLOOD CELLS: 6 /100 WBC
OCCULT BLOOD SCREENING: NORMAL
PDW BLD-RTO: 33.1 % (ref 12.4–15.4)
PERFORMED ON: ABNORMAL
PH UA: 6
PLATELET # BLD: 381 K/UL (ref 135–450)
PLATELET SLIDE REVIEW: ADEQUATE
PMV BLD AUTO: 7.2 FL (ref 5–10.5)
POLYCHROMASIA: ABNORMAL
POTASSIUM SERPL-SCNC: 3.7 MMOL/L (ref 3.5–5.1)
PROTEIN UA: NEGATIVE MG/DL
PROTHROMBIN TIME: 11.7 SEC (ref 9.8–13)
RBC # BLD: 1.76 M/UL (ref 4–5.2)
RBC UA: ABNORMAL /HPF (ref 0–2)
RETICULOCYTE ABSOLUTE COUNT: 0.61 M/UL (ref 0.02–0.1)
RETICULOCYTE COUNT PCT: >27.75 % (ref 0.5–2.18)
RHEUMATOID FACTOR: <10 IU/ML
SEDIMENTATION RATE, ERYTHROCYTE: 60 MM/HR (ref 0–20)
SLIDE REVIEW: ABNORMAL
SODIUM BLD-SCNC: 136 MMOL/L (ref 136–145)
SPECIFIC GRAVITY UA: 1.02
SPHEROCYTES: ABNORMAL
STOMATOCYTES: ABNORMAL
TOTAL IRON BINDING CAPACITY: 287 UG/DL (ref 260–445)
TOTAL IRON BINDING CAPACITY: 324 UG/DL (ref 260–445)
TOTAL PROTEIN: 6.4 G/DL (ref 6.4–8.2)
TOXIC GRANULATION: PRESENT
URINE REFLEX TO CULTURE: YES
URINE TYPE: ABNORMAL
UROBILINOGEN, URINE: 0.2 E.U./DL
VITAMIN B-12: 275 PG/ML (ref 211–911)
WBC # BLD: 32.6 K/UL (ref 4–11)
WBC UA: ABNORMAL /HPF (ref 0–5)

## 2018-08-22 PROCEDURE — 6360000002 HC RX W HCPCS: Performed by: PHYSICIAN ASSISTANT

## 2018-08-22 PROCEDURE — 86923 COMPATIBILITY TEST ELECTRIC: CPT

## 2018-08-22 PROCEDURE — 86850 RBC ANTIBODY SCREEN: CPT

## 2018-08-22 PROCEDURE — 2580000003 HC RX 258: Performed by: INTERNAL MEDICINE

## 2018-08-22 PROCEDURE — 86900 BLOOD TYPING SEROLOGIC ABO: CPT

## 2018-08-22 PROCEDURE — 85730 THROMBOPLASTIN TIME PARTIAL: CPT

## 2018-08-22 PROCEDURE — 70450 CT HEAD/BRAIN W/O DYE: CPT

## 2018-08-22 PROCEDURE — 87390 HIV-1 AG IA: CPT

## 2018-08-22 PROCEDURE — 1200000000 HC SEMI PRIVATE

## 2018-08-22 PROCEDURE — 86880 COOMBS TEST DIRECT: CPT

## 2018-08-22 PROCEDURE — 82746 ASSAY OF FOLIC ACID SERUM: CPT

## 2018-08-22 PROCEDURE — 99255 IP/OBS CONSLTJ NEW/EST HI 80: CPT | Performed by: INTERNAL MEDICINE

## 2018-08-22 PROCEDURE — 72125 CT NECK SPINE W/O DYE: CPT

## 2018-08-22 PROCEDURE — 86701 HIV-1ANTIBODY: CPT

## 2018-08-22 PROCEDURE — 87086 URINE CULTURE/COLONY COUNT: CPT

## 2018-08-22 PROCEDURE — 82728 ASSAY OF FERRITIN: CPT

## 2018-08-22 PROCEDURE — 80053 COMPREHEN METABOLIC PANEL: CPT

## 2018-08-22 PROCEDURE — 83010 ASSAY OF HAPTOGLOBIN QUANT: CPT

## 2018-08-22 PROCEDURE — 6370000000 HC RX 637 (ALT 250 FOR IP): Performed by: INTERNAL MEDICINE

## 2018-08-22 PROCEDURE — 6360000002 HC RX W HCPCS: Performed by: NURSE PRACTITIONER

## 2018-08-22 PROCEDURE — 81001 URINALYSIS AUTO W/SCOPE: CPT

## 2018-08-22 PROCEDURE — 86922 COMPATIBILITY TEST ANTIGLOB: CPT

## 2018-08-22 PROCEDURE — 82607 VITAMIN B-12: CPT

## 2018-08-22 PROCEDURE — 85610 PROTHROMBIN TIME: CPT

## 2018-08-22 PROCEDURE — 85027 COMPLETE CBC AUTOMATED: CPT

## 2018-08-22 PROCEDURE — 86860 RBC ANTIBODY ELUTION: CPT

## 2018-08-22 PROCEDURE — 36430 TRANSFUSION BLD/BLD COMPNT: CPT

## 2018-08-22 PROCEDURE — 86702 HIV-2 ANTIBODY: CPT

## 2018-08-22 PROCEDURE — 86706 HEP B SURFACE ANTIBODY: CPT

## 2018-08-22 PROCEDURE — 84238 ASSAY NONENDOCRINE RECEPTOR: CPT

## 2018-08-22 PROCEDURE — 36415 COLL VENOUS BLD VENIPUNCTURE: CPT

## 2018-08-22 PROCEDURE — 84703 CHORIONIC GONADOTROPIN ASSAY: CPT

## 2018-08-22 PROCEDURE — 86803 HEPATITIS C AB TEST: CPT

## 2018-08-22 PROCEDURE — 85045 AUTOMATED RETICULOCYTE COUNT: CPT

## 2018-08-22 PROCEDURE — 83540 ASSAY OF IRON: CPT

## 2018-08-22 PROCEDURE — 88184 FLOWCYTOMETRY/ TC 1 MARKER: CPT

## 2018-08-22 PROCEDURE — 85025 COMPLETE CBC W/AUTO DIFF WBC: CPT

## 2018-08-22 PROCEDURE — 83036 HEMOGLOBIN GLYCOSYLATED A1C: CPT

## 2018-08-22 PROCEDURE — 83550 IRON BINDING TEST: CPT

## 2018-08-22 PROCEDURE — 87340 HEPATITIS B SURFACE AG IA: CPT

## 2018-08-22 PROCEDURE — 96375 TX/PRO/DX INJ NEW DRUG ADDON: CPT

## 2018-08-22 PROCEDURE — 2500000003 HC RX 250 WO HCPCS: Performed by: INTERNAL MEDICINE

## 2018-08-22 PROCEDURE — 86431 RHEUMATOID FACTOR QUANT: CPT

## 2018-08-22 PROCEDURE — 99284 EMERGENCY DEPT VISIT MOD MDM: CPT

## 2018-08-22 PROCEDURE — 85652 RBC SED RATE AUTOMATED: CPT

## 2018-08-22 PROCEDURE — 88185 FLOWCYTOMETRY/TC ADD-ON: CPT

## 2018-08-22 PROCEDURE — 71250 CT THORAX DX C-: CPT

## 2018-08-22 PROCEDURE — 86901 BLOOD TYPING SEROLOGIC RH(D): CPT

## 2018-08-22 PROCEDURE — 86038 ANTINUCLEAR ANTIBODIES: CPT

## 2018-08-22 PROCEDURE — 88237 TISSUE CULTURE BONE MARROW: CPT

## 2018-08-22 PROCEDURE — 96374 THER/PROPH/DIAG INJ IV PUSH: CPT

## 2018-08-22 PROCEDURE — 2580000003 HC RX 258: Performed by: PHYSICIAN ASSISTANT

## 2018-08-22 PROCEDURE — 88275 CYTOGENETICS 100-300: CPT

## 2018-08-22 PROCEDURE — 83615 LACTATE (LD) (LDH) ENZYME: CPT

## 2018-08-22 PROCEDURE — 86708 HEPATITIS A ANTIBODY: CPT

## 2018-08-22 PROCEDURE — 88271 CYTOGENETICS DNA PROBE: CPT

## 2018-08-22 PROCEDURE — G0328 FECAL BLOOD SCRN IMMUNOASSAY: HCPCS

## 2018-08-22 RX ORDER — SODIUM CHLORIDE 0.9 % (FLUSH) 0.9 %
10 SYRINGE (ML) INJECTION PRN
Status: DISCONTINUED | OUTPATIENT
Start: 2018-08-22 | End: 2018-08-22 | Stop reason: SDUPTHER

## 2018-08-22 RX ORDER — METOCLOPRAMIDE HYDROCHLORIDE 5 MG/ML
10 INJECTION INTRAMUSCULAR; INTRAVENOUS ONCE
Status: COMPLETED | OUTPATIENT
Start: 2018-08-22 | End: 2018-08-22

## 2018-08-22 RX ORDER — DEXTROSE MONOHYDRATE 25 G/50ML
12.5 INJECTION, SOLUTION INTRAVENOUS PRN
Status: DISCONTINUED | OUTPATIENT
Start: 2018-08-22 | End: 2018-08-25 | Stop reason: HOSPADM

## 2018-08-22 RX ORDER — KETOROLAC TROMETHAMINE 30 MG/ML
30 INJECTION, SOLUTION INTRAMUSCULAR; INTRAVENOUS ONCE
Status: COMPLETED | OUTPATIENT
Start: 2018-08-22 | End: 2018-08-22

## 2018-08-22 RX ORDER — PREDNISONE 20 MG/1
80 TABLET ORAL DAILY
Status: DISCONTINUED | OUTPATIENT
Start: 2018-08-23 | End: 2018-08-25 | Stop reason: HOSPADM

## 2018-08-22 RX ORDER — SODIUM CHLORIDE 9 MG/ML
INJECTION, SOLUTION INTRAVENOUS CONTINUOUS
Status: DISCONTINUED | OUTPATIENT
Start: 2018-08-22 | End: 2018-08-24

## 2018-08-22 RX ORDER — NICOTINE POLACRILEX 4 MG
15 LOZENGE BUCCAL PRN
Status: DISCONTINUED | OUTPATIENT
Start: 2018-08-22 | End: 2018-08-22 | Stop reason: SDUPTHER

## 2018-08-22 RX ORDER — ONDANSETRON 2 MG/ML
4 INJECTION INTRAMUSCULAR; INTRAVENOUS EVERY 6 HOURS PRN
Status: DISCONTINUED | OUTPATIENT
Start: 2018-08-22 | End: 2018-08-25 | Stop reason: HOSPADM

## 2018-08-22 RX ORDER — SODIUM CHLORIDE 0.9 % (FLUSH) 0.9 %
10 SYRINGE (ML) INJECTION PRN
Status: DISCONTINUED | OUTPATIENT
Start: 2018-08-22 | End: 2018-08-25 | Stop reason: HOSPADM

## 2018-08-22 RX ORDER — DEXTROSE MONOHYDRATE 50 MG/ML
100 INJECTION, SOLUTION INTRAVENOUS PRN
Status: DISCONTINUED | OUTPATIENT
Start: 2018-08-22 | End: 2018-08-25 | Stop reason: HOSPADM

## 2018-08-22 RX ORDER — 0.9 % SODIUM CHLORIDE 0.9 %
250 INTRAVENOUS SOLUTION INTRAVENOUS ONCE
Status: COMPLETED | OUTPATIENT
Start: 2018-08-22 | End: 2018-08-24

## 2018-08-22 RX ORDER — DEXTROSE MONOHYDRATE 25 G/50ML
12.5 INJECTION, SOLUTION INTRAVENOUS PRN
Status: DISCONTINUED | OUTPATIENT
Start: 2018-08-22 | End: 2018-08-22 | Stop reason: SDUPTHER

## 2018-08-22 RX ORDER — DIPHENHYDRAMINE HYDROCHLORIDE 50 MG/ML
12.5 INJECTION INTRAMUSCULAR; INTRAVENOUS ONCE
Status: COMPLETED | OUTPATIENT
Start: 2018-08-22 | End: 2018-08-22

## 2018-08-22 RX ORDER — BUTALBITAL, ACETAMINOPHEN AND CAFFEINE 50; 325; 40 MG/1; MG/1; MG/1
1 TABLET ORAL EVERY 6 HOURS PRN
Status: DISCONTINUED | OUTPATIENT
Start: 2018-08-22 | End: 2018-08-25 | Stop reason: HOSPADM

## 2018-08-22 RX ORDER — DEXTROSE MONOHYDRATE 50 MG/ML
100 INJECTION, SOLUTION INTRAVENOUS PRN
Status: DISCONTINUED | OUTPATIENT
Start: 2018-08-22 | End: 2018-08-22 | Stop reason: SDUPTHER

## 2018-08-22 RX ORDER — METHYLPREDNISOLONE SODIUM SUCCINATE 125 MG/2ML
125 INJECTION, POWDER, LYOPHILIZED, FOR SOLUTION INTRAMUSCULAR; INTRAVENOUS ONCE
Status: COMPLETED | OUTPATIENT
Start: 2018-08-22 | End: 2018-08-22

## 2018-08-22 RX ORDER — 0.9 % SODIUM CHLORIDE 0.9 %
1000 INTRAVENOUS SOLUTION INTRAVENOUS ONCE
Status: COMPLETED | OUTPATIENT
Start: 2018-08-22 | End: 2018-08-22

## 2018-08-22 RX ORDER — NICOTINE POLACRILEX 4 MG
15 LOZENGE BUCCAL PRN
Status: DISCONTINUED | OUTPATIENT
Start: 2018-08-22 | End: 2018-08-25 | Stop reason: HOSPADM

## 2018-08-22 RX ORDER — ESCITALOPRAM OXALATE 10 MG/1
10 TABLET ORAL DAILY
Status: DISCONTINUED | OUTPATIENT
Start: 2018-08-22 | End: 2018-08-25 | Stop reason: HOSPADM

## 2018-08-22 RX ORDER — FAMOTIDINE 20 MG/1
20 TABLET, FILM COATED ORAL 2 TIMES DAILY
Status: DISCONTINUED | OUTPATIENT
Start: 2018-08-22 | End: 2018-08-25 | Stop reason: HOSPADM

## 2018-08-22 RX ORDER — 0.9 % SODIUM CHLORIDE 0.9 %
250 INTRAVENOUS SOLUTION INTRAVENOUS ONCE
Status: DISCONTINUED | OUTPATIENT
Start: 2018-08-22 | End: 2018-08-25 | Stop reason: HOSPADM

## 2018-08-22 RX ORDER — SODIUM CHLORIDE 0.9 % (FLUSH) 0.9 %
10 SYRINGE (ML) INJECTION EVERY 12 HOURS
Status: DISCONTINUED | OUTPATIENT
Start: 2018-08-22 | End: 2018-08-25 | Stop reason: HOSPADM

## 2018-08-22 RX ORDER — SODIUM CHLORIDE 0.9 % (FLUSH) 0.9 %
10 SYRINGE (ML) INJECTION EVERY 12 HOURS SCHEDULED
Status: DISCONTINUED | OUTPATIENT
Start: 2018-08-22 | End: 2018-08-22 | Stop reason: SDUPTHER

## 2018-08-22 RX ADMIN — KETOROLAC TROMETHAMINE 30 MG: 30 INJECTION, SOLUTION INTRAMUSCULAR at 11:10

## 2018-08-22 RX ADMIN — SODIUM CHLORIDE, PRESERVATIVE FREE 10 ML: 5 INJECTION INTRAVENOUS at 12:55

## 2018-08-22 RX ADMIN — SODIUM CHLORIDE: 9 INJECTION, SOLUTION INTRAVENOUS at 17:52

## 2018-08-22 RX ADMIN — METOCLOPRAMIDE 10 MG: 5 INJECTION, SOLUTION INTRAMUSCULAR; INTRAVENOUS at 11:10

## 2018-08-22 RX ADMIN — PROCHLORPERAZINE EDISYLATE 10 MG: 5 INJECTION INTRAMUSCULAR; INTRAVENOUS at 22:55

## 2018-08-22 RX ADMIN — METHYLPREDNISOLONE SODIUM SUCCINATE 125 MG: 125 INJECTION, POWDER, FOR SOLUTION INTRAMUSCULAR; INTRAVENOUS at 11:10

## 2018-08-22 RX ADMIN — DIPHENHYDRAMINE HYDROCHLORIDE 12.5 MG: 50 INJECTION, SOLUTION INTRAMUSCULAR; INTRAVENOUS at 11:10

## 2018-08-22 RX ADMIN — FAMOTIDINE 20 MG: 20 TABLET ORAL at 22:54

## 2018-08-22 RX ADMIN — METFORMIN HYDROCHLORIDE 1000 MG: 500 TABLET ORAL at 17:53

## 2018-08-22 RX ADMIN — SODIUM CHLORIDE 1000 ML: 9 INJECTION, SOLUTION INTRAVENOUS at 11:12

## 2018-08-22 RX ADMIN — SODIUM CHLORIDE, PRESERVATIVE FREE 10 ML: 5 INJECTION INTRAVENOUS at 22:56

## 2018-08-22 RX ADMIN — BUTALBITAL, ACETAMINOPHEN, AND CAFFEINE 1 TABLET: 50; 325; 40 TABLET ORAL at 17:53

## 2018-08-22 RX ADMIN — DOXYCYCLINE 100 MG: 100 INJECTION, POWDER, LYOPHILIZED, FOR SOLUTION INTRAVENOUS at 23:45

## 2018-08-22 ASSESSMENT — PAIN SCALES - GENERAL
PAINLEVEL_OUTOF10: 6
PAINLEVEL_OUTOF10: 7
PAINLEVEL_OUTOF10: 0
PAINLEVEL_OUTOF10: 6

## 2018-08-22 ASSESSMENT — PAIN DESCRIPTION - DESCRIPTORS: DESCRIPTORS: THROBBING

## 2018-08-22 ASSESSMENT — PAIN DESCRIPTION - LOCATION: LOCATION: HEAD

## 2018-08-22 NOTE — H&P
MG tablet Take 10 mg by mouth daily. Yes Historical Provider, MD       Allergies:  Morphine and Vicoprofen [hydrocodone-ibuprofen]    Social History:      The patient currently lives At home    TOBACCO:   reports that she has been smoking. She has been smoking about 0.25 packs per day. She has never used smokeless tobacco.  ETOH:   reports that she drinks alcohol. Family History:       Reviewed in detail and negative for DM, CAD, Cancer, CVA. Positive as follows:    Family History   Problem Relation Age of Onset    Diabetes Sister     Cancer Maternal Uncle         metastatic malig of unknown primary    Diabetes Paternal Uncle     Cancer Paternal Grandfather         prostate    Cancer Maternal Uncle         colon       REVIEW OF SYSTEMS:   Pertinent positives as noted in the HPI. All other systems reviewed and negative. PHYSICAL EXAM PERFORMED:    BP (!) 157/94   Pulse 100   Temp 98 °F (36.7 °C) (Oral)   Resp 20   Ht 5' 4\" (1.626 m)   Wt 190 lb (86.2 kg)   LMP 08/16/2018   SpO2 100%   BMI 32.61 kg/m²     General appearance:  No apparent distress, appears stated age and cooperative. HEENT:  Normal cephalic, atraumatic without obvious deformity. Pupils equal, round, and reactive to light. Extra ocular muscles intact. Conjunctivae/corneas clear. Neck: Supple, with full range of motion. No jugular venous distention. Trachea midline. Respiratory:  Normal respiratory effort. Clear to auscultation, bilaterally without Rales/Wheezes/Rhonchi. Cardiovascular:  Regular rate and rhythm with normal S1/S2 without murmurs, rubs or gallops. Abdomen: Soft, non-tender, non-distended with normal bowel sounds. Musculoskeletal:  No clubbing, cyanosis or edema bilaterally. Full range of motion without deformity. Skin: Skin color, texture, turgor normal.  No rashes or lesions. Neurologic:  Neurovascularly intact without any focal sensory/motor deficits.  Cranial nerves: II-XII intact, grossly No acute intracranial abnormality identified by CT. CT CERVICAL SPINE:      1.   Known 6 mm pulmonary nodule at the right lung apex. This is equivocally   enlarged from 5/7/2018. Follow-up nonemergent outpatient CT chest would be   beneficial to assess for any interval change of the remaining known pulmonary   nodules since the 5/7/2018 examination. 2.   Hypodense lesion in the left thyroid lobe measuring up to 1.5 cm. Further evaluation with thyroid ultrasound recommended according to   guidelines provided below. 3.   No clear evidence for acute fracture or malalignment within the cervical   spine. RECOMMENDATIONS:   1.5 cm incidental thyroid nodule      Recommend thyroid US. Reference: J Am Rickey Radiol. 2015 Feb;12(2): 143-50         CT CHEST WO CONTRAST   Final Result   Waxing and waning pulmonary nodules, most likely infectious/inflammatory. Differential considerations include cryptogenic organizing pneumonia and   Wegener granulomatosis. Dominant left lung nodules appear new from May 2018. Several nodules appear   significantly decreased in size from December 2017      Mediastinal and hilar adenopathy remains. ASSESSMENT:    Active Hospital Problems    Diagnosis Date Noted    Anemia [D64.9] 08/22/2018     Priority: High    Shortness of breath [R06.02] 12/18/2017    Mediastinal adenopathy [R59.0] 12/18/2017    Lung nodules [R91.8] 12/18/2017         PLAN:    Anemia, macrocytic, unknown etiology, admit to [place] [address] telemetry, workup initiated in, G90, folic acid, iron studies, haptoglobin, reticulocyte count, rheumatologic consultation obtained. Hemoccult done in emergency room was negative. Patient admits to drinking a bit heavily recently due to social stressors. Consider 1 unit of packed RBC transfusion today, plan for bone marrow biopsy in the morning as per oncology.     Multiple pulmonary nodules, dermatologic, patient was diagnosed and had a full workup including bronchoscopy done in December 2017 by Dr. Cara Mohr, consultation obtained from pulmonary group, further management as per pulmonary. Testing initiated with sed rate, MACK and rheumatoid factor to rule out any autoimmune process. Patient may need tissue biopsy at some point. Leukocytosis, unknown etiology, less likely infectious, monitor. Oncology following. Tension headache, CT head done in emergency room was unremarkable, no focal neurologic deficit noted, symptomatic management. DVT Prophylaxis: SCD  Diet: DIET CARB CONTROL;  Code Status: Full Code    PT/OT Eval Status: Ambulatory    Dispo - in 2-4 days       Jeanette Watts MD    Thank you Bee Ross MD for the opportunity to be involved in this patient's care. If you have any questions or concerns please feel free to contact me at [phone].

## 2018-08-22 NOTE — ED PROVIDER NOTES
ears normal, Oropharynx moist, No oral exudates, Nose normal.   Neck: Normal range of motion, No tenderness, Supple, No stridor. Eyes:   Conjunctiva normal, No discharge. Respiratory:  Normal breath sounds, No respiratory distress, No wheezing, No chest tenderness. Cardiovascular:  Normal heart rate, Normal rhythm, No murmurs, No rubs, No gallops. GI:  Bowel sounds normal, Soft, No tenderness, No masses, No pulsatile masses.        LABS  Results for orders placed or performed during the hospital encounter of 08/22/18   CBC Auto Differential   Result Value Ref Range    WBC 32.6 (H) 4.0 - 11.0 K/uL    RBC 1.76 (L) 4.00 - 5.20 M/uL    Hemoglobin 6.6 (LL) 12.0 - 16.0 g/dL    Hematocrit 20.1 (LL) 36.0 - 48.0 %    .1 (H) 80.0 - 100.0 fL    MCH 37.7 (H) 26.0 - 34.0 pg    MCHC 33.0 31.0 - 36.0 g/dL    RDW 33.1 (H) 12.4 - 15.4 %    Platelets 204 642 - 610 K/uL    MPV 7.2 5.0 - 10.5 fL    PLATELET SLIDE REVIEW Adequate     SLIDE REVIEW see below     Path Consult Yes     Neutrophils % 71.0 %    Lymphocytes % 22.0 %    Monocytes % 2.0 %    Eosinophils % 2.0 %    Basophils % 0.0 %    Neutrophils # 24.1 (H) 1.7 - 7.7 K/uL    Lymphocytes # 7.2 (H) 1.0 - 5.1 K/uL    Monocytes # 0.7 0.0 - 1.3 K/uL    Eosinophils # 0.7 (H) 0.0 - 0.6 K/uL    Basophils # 0.0 0.0 - 0.2 K/uL    Bands Relative 3 0 - 7 %    nRBC 6 (A) /100 WBC    Toxic Granulation Present (A)     Anisocytosis 2+ (A)     Macrocytes 2+ (A)     Microcytes 2+ (A)     Polychromasia 3+ (A)     Hypochromia Occasional (A)     Spherocytes 2+ (A)     Stomatocytes Occasional (A)    Comprehensive Metabolic Panel   Result Value Ref Range    Sodium 136 136 - 145 mmol/L    Potassium 3.7 3.5 - 5.1 mmol/L    Chloride 99 99 - 110 mmol/L    CO2 22 21 - 32 mmol/L    Anion Gap 15 3 - 16    Glucose 169 (H) 70 - 99 mg/dL    BUN 21 (H) 7 - 20 mg/dL    CREATININE <0.5 (L) 0.6 - 1.1 mg/dL    GFR Non-African American >60 >60    GFR African American >60 >60    Calcium 9.0 8.3 - 10.6 mg/dL    Total Protein 6.4 6.4 - 8.2 g/dL    Alb 4.2 3.4 - 5.0 g/dL    Albumin/Globulin Ratio 1.9 1.1 - 2.2    Total Bilirubin 2.2 (H) 0.0 - 1.0 mg/dL    Alkaline Phosphatase 113 40 - 129 U/L    ALT 19 10 - 40 U/L    AST 17 15 - 37 U/L    Globulin 2.2 g/dL   HCG Qualitative, Serum   Result Value Ref Range    hCG Qual Negative Detects HCG level >10 MIU/mL       RADIOLOGY  CT Cervical Spine WO Contrast   Preliminary Result   CT HEAD:      No acute intracranial abnormality identified by CT. CT CERVICAL SPINE:      1.   Known 6 mm pulmonary nodule at the right lung apex. This is equivocally   enlarged from 5/7/2018. Follow-up nonemergent outpatient CT chest would be   beneficial to assess for any interval change of the remaining known pulmonary   nodules since the 5/7/2018 examination. 2.   Hypodense lesion in the left thyroid lobe measuring up to 1.5 cm. Further evaluation with thyroid ultrasound recommended according to   guidelines provided below. 3.   No clear evidence for acute fracture or malalignment within the cervical   spine. RECOMMENDATIONS:   1.5 cm incidental thyroid nodule      Recommend thyroid US. Reference: J Am Rickey Radiol. 2015 Feb;12(2): 143-50         CT Head WO Contrast   Preliminary Result   CT HEAD:      No acute intracranial abnormality identified by CT. CT CERVICAL SPINE:      1.   Known 6 mm pulmonary nodule at the right lung apex. This is equivocally   enlarged from 5/7/2018. Follow-up nonemergent outpatient CT chest would be   beneficial to assess for any interval change of the remaining known pulmonary   nodules since the 5/7/2018 examination. 2.   Hypodense lesion in the left thyroid lobe measuring up to 1.5 cm. Further evaluation with thyroid ultrasound recommended according to   guidelines provided below. 3.   No clear evidence for acute fracture or malalignment within the cervical   spine.       RECOMMENDATIONS:   1.5 cm incidental thyroid

## 2018-08-22 NOTE — CONSULTS
does not have any pleuritic chest pain, patient does not have any significant palpitations or diaphoresis, patient did not have any fever or chills, patient denies any epistaxis, gum bleeding or hemoptysis, patient says that she does not have any increasing abdominal discomfort and nausea vomiting or hematemesis or any altered bowel habits of concern, patient did not have any hematochezia or melena, patient does not have any bleeding per vagina, patient says that she did not have any Coke-colored stools, patient does not have any leg edema, patient on questioning further states that she does not have any ecchymosis under the skin, patient on questioning further states that she had to stop smoking for 12 years but she has picked up smoking again in the last 6 months and she smokes about 6-10 cigarettes a day;patient also had some change in the carpet in the basement in the recent past, patient also says that her  brought a dog who used to live outside inside the house and it was full of ticks and take powder was used; patient also says that she has a pool in the backyard and whenever the pool cleaning agents are added she cannot breathe; patient does not have any history of recent travels, patient also says that she is under a lot of stress as she is going under hours proceedings and patient says that in the last 3 months she has been drinking a lot of alcohol;patient does not take any recreational drugs, patient takes Vyvanse on a regular basis but she patient says that neither she has taken more than usual or she has not having any Vyvanse withdrawal; patient denies any exposure to any chemicals or fumes of concern other than the ones described above, patient does not have any palpable breast masses, patient says that she had her nipples pierced 3 months back without any issues, patient does not have any skin rashes or eczema; patient does not have any confusion or lethargy, no other pertinent review of Sister     Cancer Maternal Uncle         metastatic malig of unknown primary    Diabetes Paternal Uncle     Cancer Paternal Grandfather         prostate    Cancer Maternal Uncle         colon        Social History   Substance Use Topics    Smoking status: Current Every Day Smoker     Packs/day: 0.25     Last attempt to quit: 6/20/2010    Smokeless tobacco: Never Used    Alcohol use Yes      Comment: rarely        Allergies   Allergen Reactions    Morphine Itching    Vicoprofen [Hydrocodone-Ibuprofen] Other (See Comments)     Severe stomach pains                Physical Exam:  Blood pressure 122/73, pulse 95, temperature 98.3 °F (36.8 °C), temperature source Oral, resp. rate 16, height 5' 4\" (1.626 m), weight 196 lb (88.9 kg), last menstrual period 08/16/2018, SpO2 98 %.'   Constitutional:  No acute distress. HENT:  Oropharynx is clear and moist. No thyromegaly. Eyes:  Conjunctivae are normal. Pupils equal, round, and reactive to light. No scleral icterus. Neck: . No tracheal deviation present. No obvious thyroid mass. Cardiovascular: Normal rate, regular rhythm, normal heart sounds. No right ventricular heave. No lower extremity edema. Pulmonary/Chest: No wheezes. No rales. Chest wall is not dull to percussion. No accessory muscle usage or stridor. Abdominal: Soft. Bowel sounds present. No distension or hernia. No tenderness. Musculoskeletal: No cyanosis. No clubbing. No obvious joint deformity. Lymphadenopathy: No cervical or supraclavicular adenopathy. Skin: Skin is warm and dry. No rash or nodules on the exposed extremities. tattoos present  Psychiatric: slightly emotional when seen earlier  Neurologic: Alert, awake and oriented. PERRL.   Speech fluent        Results:  CBC:   Recent Labs      08/22/18   1100  08/22/18   1432   WBC  32.6*  28.9*   HGB  6.6*  6.0*   HCT  20.1*  18.4*  18.3*   MCV  114.1*  114.6*   PLT  381  355     BMP:   Recent Labs      08/22/18   1100   NA  136   K 3.7   CL  99   CO2  22   BUN  21*   CREATININE  <0.5*     LIVER PROFILE:   Recent Labs      08/22/18   1100   AST  17   ALT  19   BILITOT  2.2*   ALKPHOS  113       UA:  Recent Labs      08/22/18   1216   COLORU  Yellow   PHUR  6.0   LABCAST  1-3 Hyaline*   WBCUA  3-5   RBCUA  0-2   BACTERIA  1+*   CLARITYU  Clear   SPECGRAV  1.025   LEUKOCYTESUR  TRACE*   UROBILINOGEN  0.2   BILIRUBINUR  SMALL*   BLOODU  Negative   GLUCOSEU  Negative       Imaging:  I have reviewed radiology images personally. CT Cervical Spine WO Contrast   Final Result   CT HEAD:      No acute intracranial abnormality identified by CT. CT CERVICAL SPINE:      1.   Known 6 mm pulmonary nodule at the right lung apex. This is equivocally   enlarged from 5/7/2018. Follow-up nonemergent outpatient CT chest would be   beneficial to assess for any interval change of the remaining known pulmonary   nodules since the 5/7/2018 examination. 2.   Hypodense lesion in the left thyroid lobe measuring up to 1.5 cm. Further evaluation with thyroid ultrasound recommended according to   guidelines provided below. 3.   No clear evidence for acute fracture or malalignment within the cervical   spine. RECOMMENDATIONS:   1.5 cm incidental thyroid nodule      Recommend thyroid US. Reference: J Am Rickey Radiol. 2015 Feb;12(2): 143-50         CT Head WO Contrast   Final Result   CT HEAD:      No acute intracranial abnormality identified by CT. CT CERVICAL SPINE:      1.   Known 6 mm pulmonary nodule at the right lung apex. This is equivocally   enlarged from 5/7/2018. Follow-up nonemergent outpatient CT chest would be   beneficial to assess for any interval change of the remaining known pulmonary   nodules since the 5/7/2018 examination. 2.   Hypodense lesion in the left thyroid lobe measuring up to 1.5 cm. Further evaluation with thyroid ultrasound recommended according to   guidelines provided below.       3.   No clear evidence for acute fracture or malalignment within the cervical   spine. RECOMMENDATIONS:   1.5 cm incidental thyroid nodule      Recommend thyroid US. Reference: J Am Rickey Radiol. 2015 Feb;12(2): 143-50         CT CHEST WO CONTRAST   Final Result   Waxing and waning pulmonary nodules, most likely infectious/inflammatory. Differential considerations include cryptogenic organizing pneumonia and   Wegener granulomatosis. Dominant left lung nodules appear new from May 2018. Several nodules appear   significantly decreased in size from December 2017      Mediastinal and hilar adenopathy remains. Ct Head Wo Contrast    Result Date: 8/22/2018  EXAMINATION: CT OF THE HEAD WITHOUT CONTRAST; CT OF THE CERVICAL SPINE WITHOUT CONTRAST 8/22/2018 11:24 am; 8/22/2018 11:33 am TECHNIQUE: CT of the head was performed without the administration of intravenous contrast. Dose modulation, iterative reconstruction, and/or weight based adjustment of the mA/kV was utilized to reduce the radiation dose to as low as reasonably achievable.; CT of the cervical spine was performed without the administration of intravenous contrast. Multiplanar reformatted images are provided for review. Dose modulation, iterative reconstruction, and/or weight based adjustment of the mA/kV was utilized to reduce the radiation dose to as low as reasonably achievable. COMPARISON: CT neck from 12/4/2017, CT chest from 5/7/2018 HISTORY: ORDERING SYSTEM PROVIDED HISTORY: fall, headache TECHNOLOGIST PROVIDED HISTORY: If patient is on cardiac monitor and/or pulse ox, they may be taken off cardiac monitor and pulse ox, left on O2 if currently on. All monitors reattached when patient returns to room. Has a \"code stroke\" or \"stroke alert\" been called? ->No Ordering Physician Provided Reason for Exam: fell last week and hit front of neck, pain in neck and head, blurred vison increasing since Acuity: Acute Type of Exam: Initial Mechanism of Injury: fall; ORDERING SYSTEM PROVIDED HISTORY: fall; neck pain and headache TECHNOLOGIST PROVIDED HISTORY: If patient is on cardiac monitor and/or pulse ox, they may be taken off cardiac monitor and pulse ox, left on O2 if currently on. All monitors reattached when patient returns to room. Ordering Physician Provided Reason for Exam: fall 1 week ago, hit front of neck, neck pain and tightness increasing Acuity: Acute Type of Exam: Initial Mechanism of Injury: fall 82-year-old female with headache and neck pain after a fall FINDINGS: CT HEAD: BRAIN/VENTRICLES: The foramen magnum and 4th ventricle appear patent. The ventricles are normal in size and midline in position. The sulci, cisterns, and extra-axial spaces are grossly unremarkable in appearance. No abnormal extra-axial fluid collections are identified. There is no clear evidence for acute intracranial hemorrhage, mass, mass effect, or midline shift. There is gross preservation of the gray-white matter differentiation. The bilateral basal ganglia, thalami, and insular ribbons are relatively well-defined. ORBITS: The visualized portion of the orbits demonstrate no acute abnormality. SINUSES: The visualized paranasal sinuses and mastoid air cells demonstrate no acute abnormality. SOFT TISSUES/SKULL: No acute abnormality of the visualized skull or soft tissues. CT CERVICAL SPINE: BONES/ALIGNMENT: Cervical spine imaged from the skull base to the inferior endplate of T1 on the sagittal reconstructions. Gross preservation of the vertebral body heights and intervertebral disc spaces. Alignment is well maintained. Odontoid appears grossly intact. Lateral masses are symmetric in appearance. Articular pillars appear grossly intact on the coronal reconstructions. Lateral masses are symmetric in appearance. Axial images demonstrate no clear evidence for acute fracture within the cervical spine. DEGENERATIVE CHANGES: No significant degenerative changes.  SOFT focal wall thickening. Persistent mediastinal and hilar adenopathy, similar to May 2018. Lungs/pleura: The central airways are patent. Dominant nodules in the left lung appear new from May 2018, the largest in the left upper lobe measuring 15 x 13 mm. A smaller nodule, also new, measures 9 x 7 mm in the left upper lobe. Dominant nodule in the right lower lobe measures approximately 18 x 16 mm, not substantially changed from May 2018 previously measuring 20 x 14 mm. There are scattered bilateral subcentimeter nodules, similar to the previous exam. Of note, several nodules on the December 2017 exam appears significantly decreased in size. There is no pleural effusion. Upper Abdomen: Limited imaging through the upper abdomen is unremarkable. Soft Tissues/Bones: Bilateral breast implants. No axillary adenopathy. There are no suspicious osseous lesions. Waxing and waning pulmonary nodules, most likely infectious/inflammatory. Differential considerations include cryptogenic organizing pneumonia and Wegener granulomatosis. Dominant left lung nodules appear new from May 2018. Several nodules appear significantly decreased in size from December 2017 Mediastinal and hilar adenopathy remains. Ct Cervical Spine Wo Contrast    Result Date: 8/22/2018  EXAMINATION: CT OF THE HEAD WITHOUT CONTRAST; CT OF THE CERVICAL SPINE WITHOUT CONTRAST 8/22/2018 11:24 am; 8/22/2018 11:33 am TECHNIQUE: CT of the head was performed without the administration of intravenous contrast. Dose modulation, iterative reconstruction, and/or weight based adjustment of the mA/kV was utilized to reduce the radiation dose to as low as reasonably achievable.; CT of the cervical spine was performed without the administration of intravenous contrast. Multiplanar reformatted images are provided for review.  Dose modulation, iterative reconstruction, and/or weight based adjustment of the mA/kV was utilized to reduce the radiation dose to as low as reasonably achievable. COMPARISON: CT neck from 12/4/2017, CT chest from 5/7/2018 HISTORY: ORDERING SYSTEM PROVIDED HISTORY: fall, headache TECHNOLOGIST PROVIDED HISTORY: If patient is on cardiac monitor and/or pulse ox, they may be taken off cardiac monitor and pulse ox, left on O2 if currently on. All monitors reattached when patient returns to room. Has a \"code stroke\" or \"stroke alert\" been called? ->No Ordering Physician Provided Reason for Exam: fell last week and hit front of neck, pain in neck and head, blurred vison increasing since Acuity: Acute Type of Exam: Initial Mechanism of Injury: fall; ORDERING SYSTEM PROVIDED HISTORY: fall; neck pain and headache TECHNOLOGIST PROVIDED HISTORY: If patient is on cardiac monitor and/or pulse ox, they may be taken off cardiac monitor and pulse ox, left on O2 if currently on. All monitors reattached when patient returns to room. Ordering Physician Provided Reason for Exam: fall 1 week ago, hit front of neck, neck pain and tightness increasing Acuity: Acute Type of Exam: Initial Mechanism of Injury: fall 57-year-old female with headache and neck pain after a fall FINDINGS: CT HEAD: BRAIN/VENTRICLES: The foramen magnum and 4th ventricle appear patent. The ventricles are normal in size and midline in position. The sulci, cisterns, and extra-axial spaces are grossly unremarkable in appearance. No abnormal extra-axial fluid collections are identified. There is no clear evidence for acute intracranial hemorrhage, mass, mass effect, or midline shift. There is gross preservation of the gray-white matter differentiation. The bilateral basal ganglia, thalami, and insular ribbons are relatively well-defined. ORBITS: The visualized portion of the orbits demonstrate no acute abnormality. SINUSES: The visualized paranasal sinuses and mastoid air cells demonstrate no acute abnormality. SOFT TISSUES/SKULL: No acute abnormality of the visualized skull or soft tissues.  CT CERVICAL

## 2018-08-22 NOTE — CONSULTS
Hematology/Oncology Consultation         Patient:   Mic Lee       Reason for Consult:  Leukocytosis and severe, acute anemia   Requesting Physician: No ref. provider found    Chief Complaint:     Chief Complaint   Patient presents with    Migraine     Pt reports symptoms for 1 week. Treated at [facility] and [facility]. Symptoms not improving. Symptoms started after fall. History Obtained from:     patient, mother, electronic medical record    History of Present Illness:     Mic Lee is a 41 y.o. female  with significant past medical history of endometriosis, migraine, ADD, pulmonary lung nodules and mediastinal LAD, thyroid nodule, and type 2 DM who presents with worsening headache and fatigue. She was found to have a WBC of 32 and Hgb of 6.6 and presence of nucleated RBC on smear. Platelet count over 329. We are asked to see patient to rule out leukemia and determine the cause of her deranged blood counts. She denies fevers or chills at home. She has a low grade temp in the ED- 99. No night sweats. No SOB, chest pain, or nausea and vomiting. No change in bowel habits. She does elicit losing more blood with menses. She fall about 9 days ago at home. She denies dizziness at the time, but says she fell because her high heel got stuck on a rug. She is under a lot of stress right now and going through a divorce which is set to finalize on Monday. She has been taking a lot of Aleve for the headache (post fall). She was taking about 800 mg every 8 hours for 9 days. Denies any sick contacts or bug bites. Denies any spider bites. No rashes or bruising. No new vitamins/supplements. No mouth pain. NO LE swelling. No cough. No unintentional weight loss. No dysphagia. Total bili is elevated 2.2.  Liver enzymes are normal.     Past Medical History:         Diagnosis Date    Diabetes mellitus ([facility] [address]        Past Surgical History:         Procedure Laterality Date    BREAST SURGERY  10/06    bilat breast augmentation    BRONCHOSCOPY  2017    EBUS and Bronch for Abnormal CT Scan     SECTION  2009    x2  and 2003    COLONOSCOPY  14    LASIK      LEEP      dysplasia    NASAL SEPTUM SURGERY      TONSILLECTOMY AND ADENOIDECTOMY      WISDOM TOOTH EXTRACTION         Current Medications:     Current Facility-Administered Medications   Medication Dose Route Frequency Provider Last Rate Last Dose    0.9 % sodium chloride bolus  250 mL Intravenous Once Adam Cam PA-C        sodium chloride flush 0.9 % injection 10 mL  10 mL Intravenous Q12H Adam Cam PA-C   10 mL at 18 1255    sodium chloride flush 0.9 % injection 10 mL  10 mL Intravenous PRN Adam Cam PA-C        metFORMIN (GLUCOPHAGE) tablet 1,000 mg  1,000 mg Oral BID WC Adan Agrawal MD        lisdexamfetamine (VYVANSE) capsule 70 mg  70 mg Oral QAM Adan Agrawal MD        escitalopram (LEXAPRO) tablet 10 mg  10 mg Oral Daily Adan Agrawal MD        butalbital-APAP-caffeine -40 MG per capsule 1 capsule  1 capsule Oral Q6H PRN Adan Agrawal MD        0.9 % sodium chloride infusion   Intravenous Continuous Adan Agrawal MD        sodium chloride flush 0.9 % injection 10 mL  10 mL Intravenous 2 times per day Adan Agrawal MD        sodium chloride flush 0.9 % injection 10 mL  10 mL Intravenous PRN Adan Agrawal MD        magnesium hydroxide (MILK OF MAGNESIA) 400 MG/5ML suspension 30 mL  30 mL Oral Daily PRN Adan Agrawal MD        [facility]) injection 4 mg  4 mg Intravenous Q6H PRN Adan Agrawal MD        famotidine (PEPCID) tablet 20 mg  20 mg Oral BID Adan Agrawal MD        glucose (GLUTOSE) 40 % oral gel 15 g  15 g Oral PRN Adan Agrawal MD        dextrose 50 % solution 12.5 g  12.5 g Intravenous PRN Adan Agrawal MD        glucagon (rDNA) injection 1 mg  1 mg Intramuscular PRN Adan Agrawal MD  dextrose 5 % solution  100 mL/hr Intravenous PRN Hortensia Bob MD        insulin lispro (HUMALOG) injection vial 0-6 Units  0-6 Units Subcutaneous TID WC Hortensia Bob MD        insulin lispro (HUMALOG) injection vial 0-3 Units  0-3 Units Subcutaneous Nightly Hortensia Bob MD        0.9 % sodium chloride bolus  250 mL Intravenous Once Hortensia Bob MD           Allergies: Allergies   Allergen Reactions    Morphine Itching    Vicoprofen [Hydrocodone-Ibuprofen] Other (See Comments)     Severe stomach pains        Social History:     Social History     Social History    Marital status:      Spouse name: N/A    Number of children: N/A    Years of education: N/A     Occupational History    Not on file.      Social History Main Topics    Smoking status: Current Every Day Smoker     Packs/day: 0.25     Last attempt to quit: 6/20/2010    Smokeless tobacco: Never Used    Alcohol use Yes      Comment: rarely    Drug use: No    Sexual activity: Yes     Partners: Male     Other Topics Concern    Not on file     Social History Narrative    No narrative on file     History   Drug Use No     History   Alcohol Use    Yes     Comment: rarely     History   Sexual Activity    Sexual activity: Yes    Partners: Male     History   Smoking Status    Current Every Day Smoker    Packs/day: 0.25    Last attempt to quit: 6/20/2010   Smokeless Tobacco    Never Used       Family History:     Family History   Problem Relation Age of Onset    Diabetes Sister     Cancer Maternal Uncle         metastatic malig of unknown primary    Diabetes Paternal Uncle     Cancer Paternal Grandfather         prostate    Cancer Maternal Uncle         colon       Review of Systems:     10 point ROS negative unless noted above     Physical Exam:     BP (!) 157/94   Pulse 100   Temp 98 °F (36.7 °C) (Oral)   Resp 20   Ht 5' 4\" (1.626 m)   Wt 190 lb (86.2 kg)   LMP 08/16/2018   SpO2 100%   BMI 32.61 kg/m²

## 2018-08-22 NOTE — ED PROVIDER NOTES
[address]  eMERGENCY dEPARTMENT eNCOUnter        Pt Name: Sinan Godoy  MRN: [id number]  [place] 1975  Date of evaluation: 8/22/2018  Provider: Ivette Navarro PA-C  PCP: Yeyo Keita MD  ED Attending: Dr. Sanjay Ashford MD    [address] [place] [address]       Chief Complaint   Patient presents with    Migraine     Pt reports symptoms for 1 week. Treated at [facility] and [facility]. Symptoms not improving. Symptoms started after fall. HISTORY OF PRESENT ILLNESS   (Location/Symptom, Timing/Onset, Context/Setting, Quality, Duration, Modifying Factors, Severity)  Note limiting factors. Sinan Godoy is a 41 y.o. female presents to the emergency department with neck pain and headache for the past week. The patient states that she was coming home and had heels on when her heel got stuck and she fell forward hitting her neck. She denies losing consciousness. She is not on anticoagulation. She states that she was seen Saturday at [facility] [place] [place] and then [employer]  Monday. She states that she has tried Allegra, Aleve and Fioricet without any relief. She states that her pain is currently a 7 out of 10 pain that comes and goes. She states that she does feel that her neck is tight into her shoulders. Denies any numbness, tingling, focal weakness, chest pain, shortness of breath or abdominal pain. She states that she does get lightheaded and has vomited since her fall. Nursing Notes were all reviewed and agreed with or any disagreements were addressed  in the HPI. REVIEW OF SYSTEMS    (2-9 systems for level 4, 10 or more for level 5)     Review of Systems    Positives and Pertinent negatives as per HPI. Except as noted above in the ROS, all other systems were reviewed and negative.        PAST MEDICAL HISTORY     Past Medical History:   Diagnosis Date    Diabetes mellitus ([address]          SURGICAL HISTORY       Past Surgical History:   Procedure Laterality Date Thought content normal.   Nursing note and vitals reviewed.       DIAGNOSTIC RESULTS   LABS:    Labs Reviewed   CBC WITH AUTO DIFFERENTIAL - Abnormal; Notable for the following:        Result Value    WBC 32.6 (*)     RBC 1.76 (*)     Hemoglobin 6.6 (*)     Hematocrit 20.1 (*)     .1 (*)     MCH 37.7 (*)     RDW 33.1 (*)     Neutrophils # 24.1 (*)     Lymphocytes # 7.2 (*)     Eosinophils # 0.7 (*)     nRBC 6 (*)     Toxic Granulation Present (*)     Anisocytosis 2+ (*)     Macrocytes 2+ (*)     Microcytes 2+ (*)     Polychromasia 3+ (*)     Hypochromia Occasional (*)     Spherocytes 2+ (*)     Stomatocytes Occasional (*)     All other components within normal limits    Narrative:     Hasmukh Mars  [phone],  Hematology results called to and read back by Vanessa Jimenes RN, 08/22/2018 11:28,  by Mich Salazar  Performed at:  [place] [address] [facility] [employer] [facility] [employer] [facility]   Phone [phone]   COMPREHENSIVE METABOLIC PANEL - Abnormal; Notable for the following:     Glucose 169 (*)     BUN 21 (*)     CREATININE <0.5 (*)     Total Bilirubin 2.2 (*)     All other components within normal limits    Narrative:     Performed at:  [place] [address] [facility] [employer] [facility] [employer] [facility]   Phone [phone]   URINE RT REFLEX TO CULTURE - Abnormal; Notable for the following:     Bilirubin Urine SMALL (*)     Leukocyte Esterase, Urine TRACE (*)     All other components within normal limits    Narrative:     Performed at:  [place] [address] [facility] [employer] [facility] [employer] [facility]   Phone [phone]   MICROSCOPIC URINALYSIS - Abnormal; Notable for the following:     Casts 1-3 Hyaline (*)     Bacteria, UA 1+ (*)     All other components within normal limits    Narrative:     Performed at:  [place] [address] [facility] [employer] [facility] [employer] [facility] [employer] [facility]   Phone [phone]   CBC - Abnormal; Notable for the following:     WBC 28.9 (*)     RBC 1.60 (*)     Hemoglobin 6.0 (*)     Hematocrit 18.3 (*)     .6 (*)     MCH 37.8 (*)     RDW 34.1 (*)     All other components within normal limits    Narrative:     CALL  [place]  [facility] tel. [phone],  Coag results called to and read back micheal suresh rn, 08/22/2018 14:57, by  FRANKY  Performed at:  [place] [address] [facility] [facility]   Phone [phone]   APTT - Abnormal; Notable for the following:     aPTT 23.9 (*)     All other components within normal limits    Narrative:     Performed at:  [place] [address] [employer] [place]   Phone [phone]   LACTATE DEHYDROGENASE - Abnormal; Notable for the following:      (*)     All other components within normal limits    Narrative:     Performed at:  [place] [address] [employer] [facility]   Phone [phone]   RETICULOCYTES - Abnormal; Notable for the following:     Retic Ct Pct >27.75 (*)     Retic Ct Abs 0.612 (*)     Immature Retic Fract 0.75 (*)     Hematocrit 18.4 (*)     All other components within normal limits    Narrative:     Darrel Cuelloliv Jiangdean [phone],  Hematology results called to and read back micheal suresh rn, 08/22/2018  14:58, by FRANKY  Performed at:  [place] [address] [facility]   Phone [phone]   SEDIMENTATION RATE - Abnormal; Notable for the following:     Sed Rate 60 (*)     All other components within normal limits    Narrative:     Darrel Reed tel. [phone],  Hematology results called to and read back micheal suresh rn, 08/22/2018  14:58, by FRANKY  Performed at:  [place] [address] [facility]   Phone [phone]   URINE CULTURE   BCR/ABL BY FISH   HCG, SERUM, QUALITATIVE    Narrative:     Performed at:  [facility] [address] [place] [address] [employer]   Phone [phone]   BLOOD OCCULT STOOL SCREEN #1    Narrative:     ORDER#: [id number]                          ORDERED BY: Violetta Simon  SOURCE: Stool                              COLLECTED:  08/22/18 12:22  ANTIBIOTICS AT RICK.:                      RECEIVED :  08/22/18 12:29  Performed at:  [place] [address] [employer]   Phone [phone]   PROTIME-INR    Narrative:     Performed at:  [address] [place] [address] [employer]   Phone [phone]   SEDIMENTATION RATE   HEMOGLOBIN A1C   IRON AND TIBC   FERRITIN   VITAMIN B12 & FOLATE   HAPTOGLOBIN   FERRITIN   IRON AND TIBC   SOLUBLE TRANSFERRIN RECEPTOR   FLOW CYTOMETRY LEUKEMIA/LYMPHOMA BLOOD   HIV SCREEN   HEPATITIS B SURFACE ANTIGEN   HEPATITIS C ANTIBODY   HEPATITIS A ANTIBODY, TOTAL   HEPATITIS B SURFACE ANTIBODY   RHEUMATOID FACTOR   MACK   POCT GLUCOSE   PREPARE RBC (CROSSMATCH)   ANTIBODY SCREEN    Narrative:     Performed at:  [place] [address] [employer]   Phone [phone]   ABO/RH    Narrative:     Performed at:  [address] [place] [address] [employer]   Phone [phone]   DIRECT ANTIGLOBULIN TEST    Narrative:     Performed at:  [address] [place] [address] [employer]   Phone [phone]   PREPARE RBC (CROSSMATCH)       All other labs were within normal range or not returned as of this dictation. EKG: All EKG's are interpreted by the Emergency Department Physician who either signs or Co-signs this chart in the absence of a cardiologist.  Please see their note for interpretation of EKG.       RADIOLOGY:   Non-plain film images such as CT, Ultrasound and MRI are read by the radiologist. Plain radiographic images are visualized and preliminarily interpreted by the  ED Provider with the below findings:        Interpretation per the Radiologist below, if available at the time of this note:    CT Cervical Spine WO Contrast   Final Result   CT HEAD:      No acute intracranial abnormality identified by CT. CT CERVICAL SPINE:      1.   Known 6 mm pulmonary nodule at the right lung apex. This is equivocally   enlarged from 5/7/2018. Follow-up nonemergent outpatient CT chest would be   beneficial to assess for any interval change of the remaining known pulmonary   nodules since the 5/7/2018 examination. 2.   Hypodense lesion in the left thyroid lobe measuring up to 1.5 cm. Further evaluation with thyroid ultrasound recommended according to   guidelines provided below. 3.   No clear evidence for acute fracture or malalignment within the cervical   spine. RECOMMENDATIONS:   1.5 cm incidental thyroid nodule      Recommend thyroid US. Reference: J Am Rickey Radiol. 2015 Feb;12(2): 143-50         CT Head WO Contrast   Final Result   CT HEAD:      No acute intracranial abnormality identified by CT. CT CERVICAL SPINE:      1.   Known 6 mm pulmonary nodule at the right lung apex. This is equivocally   enlarged from 5/7/2018. Follow-up nonemergent outpatient CT chest would be   beneficial to assess for any interval change of the remaining known pulmonary   nodules since the 5/7/2018 examination. 2.   Hypodense lesion in the left thyroid lobe measuring up to 1.5 cm. Further evaluation with thyroid ultrasound recommended according to   guidelines provided below. 3.   No clear evidence for acute fracture or malalignment within the cervical   spine. RECOMMENDATIONS:   1.5 cm incidental thyroid nodule      Recommend thyroid US. Reference: J Am Rickey Radiol. 2015 Feb;12(2): 143-50         CT CHEST WO CONTRAST   Final Result   Waxing and waning pulmonary nodules, most likely infectious/inflammatory. The hospitalist was admitting with consulting hematologist.     The patient tolerated their visit well. They were seen and evaluated by the attending physician who agreed with the assessment and plan. The patient and / or the family were informed of the results of any tests, a time was given to answer questions, a plan was proposed and they agreed with plan. FINAL IMPRESSION      1. Anemia, unspecified type    2. Leukocytosis, unspecified type    3. SOB (shortness of breath)    4. Lightheadedness    5. Acute nonintractable headache, unspecified headache type    6.  Neck pain          DISPOSITION/PLAN   DISPOSITION Admitted 08/22/2018 12:16:11 PM      PATIENT REFERRED TO:  Courtney Moise MD            DISCHARGE MEDICATIONS:  Current Discharge Medication List          DISCONTINUED MEDICATIONS:  Current Discharge Medication List                 (Please note that portions of this note were completed with a voice recognition program.  Efforts were made to edit the dictations but occasionally words are mis-transcribed.)    Ivette Navarro PA-C (electronically signed)           Sheila Darling PA-C  08/22/18 [id number]

## 2018-08-23 PROBLEM — D59.10 AUTOIMMUNE HEMOLYTIC ANEMIA (HCC): Status: ACTIVE | Noted: 2018-08-23

## 2018-08-23 PROBLEM — R73.9 HYPERGLYCEMIA: Status: ACTIVE | Noted: 2018-08-23

## 2018-08-23 LAB
A/G RATIO: 2.1 (ref 1.1–2.2)
ALBUMIN SERPL-MCNC: 4.2 G/DL (ref 3.4–5)
ALP BLD-CCNC: 116 U/L (ref 40–129)
ALT SERPL-CCNC: 19 U/L (ref 10–40)
ANA INTERPRETATION: NORMAL
ANION GAP SERPL CALCULATED.3IONS-SCNC: 14 MMOL/L (ref 3–16)
ANTI-NUCLEAR ANTIBODY (ANA): NEGATIVE
AST SERPL-CCNC: 18 U/L (ref 15–37)
BILIRUB SERPL-MCNC: 2.3 MG/DL (ref 0–1)
BLOOD BANK DISPENSE STATUS: NORMAL
BLOOD BANK PRODUCT CODE: NORMAL
BPU ID: NORMAL
BUN BLDV-MCNC: 12 MG/DL (ref 7–20)
CALCIUM SERPL-MCNC: 8.9 MG/DL (ref 8.3–10.6)
CHLORIDE BLD-SCNC: 101 MMOL/L (ref 99–110)
CO2: 22 MMOL/L (ref 21–32)
CREAT SERPL-MCNC: <0.5 MG/DL (ref 0.6–1.1)
DAT C3: NORMAL
DAT IGG: NORMAL
DESCRIPTION BLOOD BANK: NORMAL
ESTIMATED AVERAGE GLUCOSE: 59.5 MG/DL
GFR AFRICAN AMERICAN: >60
GFR NON-AFRICAN AMERICAN: >60
GLOBULIN: 2 G/DL
GLUCOSE BLD-MCNC: 138 MG/DL (ref 70–99)
GLUCOSE BLD-MCNC: 195 MG/DL (ref 70–99)
GLUCOSE BLD-MCNC: 248 MG/DL (ref 70–99)
GLUCOSE BLD-MCNC: 258 MG/DL (ref 70–99)
GLUCOSE BLD-MCNC: 301 MG/DL (ref 70–99)
HBA1C MFR BLD: 3.7 %
HCT VFR BLD CALC: 15.7 % (ref 36–48)
HCT VFR BLD CALC: 18.3 % (ref 36–48)
HEMATOLOGY PATH CONSULT: NO
HEMOGLOBIN: 5.2 G/DL (ref 12–16)
HEMOGLOBIN: 6 G/DL (ref 12–16)
HIV AG/AB: NORMAL
HIV ANTIGEN: NORMAL
HIV-1 ANTIBODY: NORMAL
HIV-2 AB: NORMAL
MCH RBC QN AUTO: 37.8 PG (ref 26–34)
MCHC RBC AUTO-ENTMCNC: 33 G/DL (ref 31–36)
MCV RBC AUTO: 114.6 FL (ref 80–100)
PDW BLD-RTO: 34.1 % (ref 12.4–15.4)
PERFORMED ON: ABNORMAL
PLATELET # BLD: 355 K/UL (ref 135–450)
PMV BLD AUTO: 7.2 FL (ref 5–10.5)
POTASSIUM SERPL-SCNC: 4.3 MMOL/L (ref 3.5–5.1)
RBC # BLD: 1.6 M/UL (ref 4–5.2)
SODIUM BLD-SCNC: 137 MMOL/L (ref 136–145)
TOTAL PROTEIN: 6.2 G/DL (ref 6.4–8.2)
URINE CULTURE, ROUTINE: NORMAL
WBC # BLD: 28.9 K/UL (ref 4–11)

## 2018-08-23 PROCEDURE — 85025 COMPLETE CBC W/AUTO DIFF WBC: CPT

## 2018-08-23 PROCEDURE — 85018 HEMOGLOBIN: CPT

## 2018-08-23 PROCEDURE — 36430 TRANSFUSION BLD/BLD COMPNT: CPT

## 2018-08-23 PROCEDURE — 6370000000 HC RX 637 (ALT 250 FOR IP): Performed by: INTERNAL MEDICINE

## 2018-08-23 PROCEDURE — 36415 COLL VENOUS BLD VENIPUNCTURE: CPT

## 2018-08-23 PROCEDURE — 2580000003 HC RX 258: Performed by: PHYSICIAN ASSISTANT

## 2018-08-23 PROCEDURE — 80053 COMPREHEN METABOLIC PANEL: CPT

## 2018-08-23 PROCEDURE — 1200000000 HC SEMI PRIVATE

## 2018-08-23 PROCEDURE — 99233 SBSQ HOSP IP/OBS HIGH 50: CPT | Performed by: INTERNAL MEDICINE

## 2018-08-23 PROCEDURE — 2580000003 HC RX 258: Performed by: INTERNAL MEDICINE

## 2018-08-23 PROCEDURE — 86923 COMPATIBILITY TEST ELECTRIC: CPT

## 2018-08-23 PROCEDURE — 2500000003 HC RX 250 WO HCPCS: Performed by: INTERNAL MEDICINE

## 2018-08-23 PROCEDURE — 85014 HEMATOCRIT: CPT

## 2018-08-23 PROCEDURE — P9016 RBC LEUKOCYTES REDUCED: HCPCS

## 2018-08-23 PROCEDURE — 6360000002 HC RX W HCPCS: Performed by: INTERNAL MEDICINE

## 2018-08-23 RX ORDER — 0.9 % SODIUM CHLORIDE 0.9 %
250 INTRAVENOUS SOLUTION INTRAVENOUS ONCE
Status: COMPLETED | OUTPATIENT
Start: 2018-08-23 | End: 2018-08-23

## 2018-08-23 RX ORDER — FOLIC ACID 1 MG/1
1 TABLET ORAL DAILY
Status: DISCONTINUED | OUTPATIENT
Start: 2018-08-23 | End: 2018-08-25 | Stop reason: HOSPADM

## 2018-08-23 RX ORDER — SODIUM CHLORIDE 9 MG/ML
INJECTION, SOLUTION INTRAVENOUS
Status: DISPENSED
Start: 2018-08-23 | End: 2018-08-24

## 2018-08-23 RX ORDER — CYANOCOBALAMIN 1000 UG/ML
1000 INJECTION INTRAMUSCULAR; SUBCUTANEOUS ONCE
Status: COMPLETED | OUTPATIENT
Start: 2018-08-23 | End: 2018-08-23

## 2018-08-23 RX ORDER — INSULIN GLARGINE 100 [IU]/ML
10 INJECTION, SOLUTION SUBCUTANEOUS NIGHTLY
Status: DISCONTINUED | OUTPATIENT
Start: 2018-08-23 | End: 2018-08-24

## 2018-08-23 RX ADMIN — SODIUM CHLORIDE, PRESERVATIVE FREE 10 ML: 5 INJECTION INTRAVENOUS at 18:41

## 2018-08-23 RX ADMIN — SODIUM CHLORIDE 250 ML: 9 INJECTION, SOLUTION INTRAVENOUS at 08:18

## 2018-08-23 RX ADMIN — FOLIC ACID 1 MG: 1 TABLET ORAL at 12:40

## 2018-08-23 RX ADMIN — DOXYCYCLINE 100 MG: 100 INJECTION, POWDER, LYOPHILIZED, FOR SOLUTION INTRAVENOUS at 18:45

## 2018-08-23 RX ADMIN — FAMOTIDINE 20 MG: 20 TABLET ORAL at 08:18

## 2018-08-23 RX ADMIN — SODIUM CHLORIDE 250 ML: 9 INJECTION, SOLUTION INTRAVENOUS at 11:39

## 2018-08-23 RX ADMIN — INSULIN LISPRO 4 UNITS: 100 INJECTION, SOLUTION INTRAVENOUS; SUBCUTANEOUS at 12:41

## 2018-08-23 RX ADMIN — INSULIN LISPRO 2 UNITS: 100 INJECTION, SOLUTION INTRAVENOUS; SUBCUTANEOUS at 20:37

## 2018-08-23 RX ADMIN — ESCITALOPRAM OXALATE 10 MG: 10 TABLET ORAL at 08:17

## 2018-08-23 RX ADMIN — SODIUM CHLORIDE, PRESERVATIVE FREE 10 ML: 5 INJECTION INTRAVENOUS at 11:26

## 2018-08-23 RX ADMIN — BUTALBITAL, ACETAMINOPHEN, AND CAFFEINE 1 TABLET: 50; 325; 40 TABLET ORAL at 08:17

## 2018-08-23 RX ADMIN — PREDNISONE 80 MG: 20 TABLET ORAL at 08:18

## 2018-08-23 RX ADMIN — INSULIN GLARGINE 10 UNITS: 100 INJECTION, SOLUTION SUBCUTANEOUS at 20:36

## 2018-08-23 RX ADMIN — FAMOTIDINE 20 MG: 20 TABLET ORAL at 20:36

## 2018-08-23 RX ADMIN — CYANOCOBALAMIN 1000 MCG: 1000 INJECTION, SOLUTION INTRAMUSCULAR at 12:40

## 2018-08-23 ASSESSMENT — PAIN SCALES - GENERAL
PAINLEVEL_OUTOF10: 0
PAINLEVEL_OUTOF10: 4
PAINLEVEL_OUTOF10: 0
PAINLEVEL_OUTOF10: 3
PAINLEVEL_OUTOF10: 0

## 2018-08-23 ASSESSMENT — PAIN DESCRIPTION - DESCRIPTORS: DESCRIPTORS: HEADACHE

## 2018-08-23 ASSESSMENT — PAIN DESCRIPTION - LOCATION: LOCATION: HEAD

## 2018-08-23 ASSESSMENT — PAIN DESCRIPTION - PAIN TYPE: TYPE: ACUTE PAIN

## 2018-08-23 NOTE — PROGRESS NOTES
INPATIENT PULMONARY CRITICAL CARE PROGRESS NOTE      Reason for visit     Abnormal CT chest ;possible atypical pneumonia    SUBJECTIVE:  Patient when seen this morning was getting blood transfusion, patient states that her headaches have gone this morning, patient does not have any blurring of vision, patient was not having significant cough expectorating shortness of breath or wheezing, patient remained afebrile and hemodynamically maintained, patient continued to be on room air with saturation of 96%, patient's blood sugars have gone up because of the steroids which was started by hematology for autoimmune hemolytic anemia, patient has had adequate urine output overnight, patient is alert and communicative, patient does not have any abdominal symptoms of concern, no other pertinent review of system of concern       Physical Exam:  Blood pressure 138/79, pulse 97, temperature 97 °F (36.1 °C), temperature source Axillary, resp. rate 16, height 5' 4\" (1.626 m), weight 196 lb (88.9 kg), last menstrual period 08/16/2018, SpO2 96 %.'   Constitutional:  No acute distress. HENT:  Oropharynx is clear and moist. No thyromegaly. Eyes:  Conjunctivae are normal. Pupils equal, round, and reactive to light. No scleral icterus. Neck: . No tracheal deviation present. No obvious thyroid mass. Cardiovascular: Normal rate, regular rhythm, normal heart sounds. No right ventricular heave. No lower extremity edema. Pulmonary/Chest: No wheezes. No rales. Chest wall is not dull to percussion. No accessory muscle usage or stridor. Abdominal: Soft. Bowel sounds present. No distension or hernia. No tenderness. Musculoskeletal: No cyanosis. No clubbing. No obvious joint deformity. Lymphadenopathy: No cervical or supraclavicular adenopathy. Skin: Skin is warm and dry. No rash or nodules on the exposed extremities. Psychiatric: Normal mood and affect. Behavior is normal.  No anxiety.    Neurologic: Alert, awake and oriented. PERRL. Speech fluent        Results:  CBC:   Recent Labs      08/22/18   1100  08/22/18   1432  08/23/18   0009  08/23/18   1505   WBC  32.6*  28.9*   --   25.4*   HGB  6.6*  6.0*  5.2*  9.6*   HCT  20.1*  18.3*  18.4*  15.7*  27.8*   MCV  114.1*  114.6*   --   98.9   PLT  381  355   --   317     BMP:   Recent Labs      08/22/18   1100  08/23/18   1505   NA  136  137   K  3.7  4.3   CL  99  101   CO2  22  22   BUN  21*  12   CREATININE  <0.5*  <0.5*     LIVER PROFILE:   Recent Labs      08/22/18   1100  08/23/18   1505   AST  17  18   ALT  19  19   BILITOT  2.2*  2.3*   ALKPHOS  113  116     PT/INR:   Recent Labs      08/22/18   1432   PROTIME  11.7   INR  1.03     APTT:   Recent Labs      08/22/18   1432   APTT  23.9*     UA:  Recent Labs      08/22/18   1216   COLORU  Yellow   PHUR  6.0   LABCAST  1-3 Hyaline*   WBCUA  3-5   RBCUA  0-2   BACTERIA  1+*   CLARITYU  Clear   SPECGRAV  1.025   LEUKOCYTESUR  TRACE*   UROBILINOGEN  0.2   BILIRUBINUR  SMALL*   BLOODU  Negative   GLUCOSEU  Negative       Imaging:  I have reviewed radiology images personally. CT Cervical Spine WO Contrast   Final Result   CT HEAD:      No acute intracranial abnormality identified by CT. CT CERVICAL SPINE:      1.   Known 6 mm pulmonary nodule at the right lung apex. This is equivocally   enlarged from 5/7/2018. Follow-up nonemergent outpatient CT chest would be   beneficial to assess for any interval change of the remaining known pulmonary   nodules since the 5/7/2018 examination. 2.   Hypodense lesion in the left thyroid lobe measuring up to 1.5 cm. Further evaluation with thyroid ultrasound recommended according to   guidelines provided below. 3.   No clear evidence for acute fracture or malalignment within the cervical   spine. RECOMMENDATIONS:   1.5 cm incidental thyroid nodule      Recommend thyroid US. Reference: J Am Rickey Radiol.  2015 Feb;12(2): 143-50         CT Head WO Contrast   Final 99 mg/dL 169 (H)      248 (H)   POC Glucose Latest Ref Range: 70 - 99 mg/dl  175 (H) 146 (H) 134 (H) 138 (H) 301 (H)    Calcium Latest Ref Range: 8.3 - 10.6 mg/dL 9.0      8.9   Total Protein Latest Ref Range: 6.4 - 8.2 g/dL 6.4      6.2 (L)     Results for Poly Dumont (MRN [id number]) as of 8/23/2018 16:03   Ref. Range 12/4/2017 17:30 8/22/2018 11:00 8/23/2018 15:05   Albumin Latest Ref Range: 3.4 - 5.0 g/dL 4.8 4.2 4.2   Globulin Latest Units: g/dL 3.3 2.2 2.0   Albumin/Globulin Ratio Latest Ref Range: 1.1 - 2.2  1.5 1.9 2.1   Alk Phos Latest Ref Range: 40 - 129 U/L 126 113 116   ALT Latest Ref Range: 10 - 40 U/L 34 19 19   AST Latest Ref Range: 15 - 37 U/L 17 17 18   Bilirubin Latest Ref Range: 0.0 - 1.0 mg/dL 0.4 2.2 (H) 2.3 (H)   Lipase Latest Ref Range: 13.0 - 60.0 U/L 17.0     Total Protein Latest Ref Range: 6.4 - 8.2 g/dL 8.1 6.4 6.2 (L)       Results for Poly Dumont (MRN [id number]) as of 8/23/2018 16:03   Ref. Range 12/4/2017 17:30 8/22/2018 11:00 8/22/2018 14:32 8/22/2018 14:32 8/23/2018 00:09 8/23/2018 15:05   WBC Latest Ref Range: 4.0 - 11.0 K/uL 12.1 (H) 32.6 (H)  28.9 (H)  25.4 (H)   RBC Latest Ref Range: 4.00 - 5.20 M/uL 5.00 1.76 (L)  1.60 (L)  2.81 (L)   Hemoglobin Quant Latest Ref Range: 12.0 - 16.0 g/dL 14.7 6.6 (LL)  6.0 (LL) 5.2 (LL) 9.6 (L)   Hematocrit Latest Ref Range: 36.0 - 48.0 % 43.2 20.1 (LL) 18.4 (LL) 18.3 (LL) 15.7 (LL) 27.8 (L)   MCV Latest Ref Range: 80.0 - 100.0 fL 86.5 114.1 (H)  114.6 (H)  98.9   MCH Latest Ref Range: 26.0 - 34.0 pg 29.3 37.7 (H)  37.8 (H)  34.3 (H)   MCHC Latest Ref Range: 31.0 - 36.0 g/dL 33.9 33.0  33.0  34.7   MPV Latest Ref Range: 5.0 - 10.5 fL 7.9 7.2  7.2  7.1   RDW Latest Ref Range: 12.4 - 15.4 % 12.7 33.1 (H)  34.1 (H)  27.8 (H)   Platelet Count Latest Ref Range: 135 - 450 K/uL 375 381  355  317   Neutrophils % Latest Units: % 64.2 71.0       Results for Poly Dumont (MRN [id number]) as of 8/23/2018 16:03   Ref.  Range 8/22/2018 14:32   Prothrombin Time Latest Ref Range: 9.8 - 13.0 sec 11.7   INR Latest Ref Range: 0.86 - 1.14  1.03   aPTT Latest Ref Range: 26.0 - 36.0 sec 23.9 (L)     ANAHI C3 08/22/2018 12:15  [address]      Results for Cody Jaimes (MRN [id number]) as of 8/23/2018 16:03   Ref. Range 8/22/2018 11:00 8/22/2018 14:28 8/22/2018 14:32   MACK Latest Ref Range: Negative  Negative     MACK Interpretation Unknown see below     Rheumatoid Factor Latest Ref Range: <14 IU/mL   <10.0   Hep B S Ab Latest Units: mIU/mL  <3.50    Hep B S Ag Interp Latest Ref Range: Non-reactive   Non-reactive    Hep C Ab Interp Latest Ref Range: Non-reactive   Non-reactive      Results for Cody Jaimes (MRN [id number]) as of 8/23/2018 16:03   Ref. Range 8/22/2018 12:15   ABO Rh Unknown AB POS   Antibody Screen Unknown NEG   Polyspecific Hitesh Unknown POS   ANAHI C3 Unknown NEG   Direct antiglobulin test, IgG Unknown POS   PREPARE RBC (CROSSMATCH) Unknown Rpt   Unit Number Unknown [id number]         Assessment:  Principal Problem:    Anemia  Active Problems:    Lung nodules    Mediastinal adenopathy    SOB (shortness of breath)    Leukocytosis    Macrocytosis    Hyperbilirubinemia    Thyroid nodule    Type 2 diabetes mellitus without complication, without long-term current use of insulin (HCC)    Current smoker    Alcohol use    Acute nonintractable headache    Autoimmune hemolytic anemia (HCC)    Hyperglycemia  Resolved Problems:    * No resolved hospital problems.  *          Plan:   · Oxygen saturation, if required, to keep saturation between 90-94% only  · Pulmonary toilet  · Case d/w Dr Alicia Loaiza -will like to hold off on any biopsies for now if not emergent   · Patient has a leukocytosis along with that patient has profound anemia which is new and patient does not have any overt signs of bleeding-patient also has decrease in haptoglobin suggesting hemolytic process-various evaluations by hematology point towards

## 2018-08-23 NOTE — ONCOLOGY
Hematology/Oncology Progress Note       Subjective:     Awake. Getting transfused presently. ROS:     Constitutional: Denies fever, sweats, weight loss. .     Eyes: No visual changes or diplopia. No scleral icterus. ENT: No Headaches, no hearing loss, no  vertigo. No mouth sores or sore throat. Cardiovascular: No chest pain, no dyspnea on exertion, no palpitations, no  loss of consciousness. Respiratory: No cough, no  wheezing, no dyspnea, no sputum production. No hemoptysis. .    Gastrointestinal: No abdominal pain, no appetite loss, no blood in stools. No change in bowel habits. Genitourinary: No dysuria, trouble voiding, or hematuria. Musculoskeletal:  Generalized weakness. No joint complaints. Integumentary: No rash or pruritis. Neurological: No headache, diplopia. No change in gait, balance, or coordination. No paresthesias. Endocrine: No temperature intolerance. No excessive thirst, fluid intake, or urination. Hematologic/Lymphatic: No abnormal bruising or ecchymoses, no blood clots or swollen lymph nodes. Allergic/Immunologic: No nasal congestion or hives. Objective:     Physical examination:     BP (!) 99/56   Pulse 88   Temp 98.1 °F (36.7 °C) (Oral)   Resp 18   Ht 5' 4\" (1.626 m)   Wt 196 lb (88.9 kg)   LMP 08/16/2018   SpO2 95%   BMI 33.64 kg/m²     CONSTITUTIONAL: awake, alert, cooperative, no apparent distress. EYES:  Pupils equal, round and reactive to light, sclera non-icteric, conjunctiva normal  ENT:  Normocephalic, without obvious abnormality, atraumatic, sinuses nontender on palpation, external ears without lesions, oral pharynx with moist mucus membranes, no mucositis.   NECK:  Supple, symmetrical, trachea midline, no adenopathy, thyroid symmetric, not enlarged and no tenderness, skin normal  HEMATOLOGIC/LYMPHATICS:  no cervical lymphadenopathy, no supraclavicular lymphadenopathy, no axillary lymphadenopathy and no inguinal lymphadenopathy  BACK:  Symmetric, no May 2018.  Several nodules appear   significantly decreased in size from December 2017       Mediastinal and hilar adenopathy remains. Impression:     Leukocytosis   Severe Anemia   Fatigue   Headache   Recent mechanical fall   Endometriosis - heavy menses   ADD   Type 2 DM   Hx of pulm lung nodules (waxing and waning in size), thoracic adenopathy   Smoker   Thyroid nodule   Elevated bilirubin     Assessment and Plan:     1) Leukocytosis with severe anemia  - ANAHI pos (IgG pos, C3 neg), abs retic 612 K/mcL (H),  U/L (H), hapto <10 mg/dL (L), t bili 2.2 mg/dL (H).  - B12 275 pg/mL, folate 8.58 ng/mL. - FOBT neg.  - Based on all of the above, and the nursing notes referencing an IgG, would think Napolean Peel is most likely. Will discuss with the lab. - Start Prednisone 1 mg/kg, Folate, B12, and transfuse least incompatible units of blood. - Would think that the leukocytosis and nRBCs are all a sign of a stressed marrow from a brisk hemolytic process.    2) Elevated Bilirubin  - From hemolysis   - Hepatitis studies neg so far.      3) Pulm nodules  - has been followed by pulmonary with serial scans with stable nodules      4) Thyroid nodule  - needs US follow up      5) Headache  - CT head negative   - Likely due to volume depletion, anemia   - No clinical signs of meningitis     Sang Ibarra MD  Medical Oncology/Hematology    [facility] - [address],  [place] [address]  Phone: [phone]  Fax: [phone]

## 2018-08-23 NOTE — PROGRESS NOTES
rhythm with normal S1/S2 without murmurs, rubs or gallops. Abdomen: Soft, non-tender, non-distended with normal bowel sounds. Musculoskeletal: No clubbing, cyanosis or edema bilaterally. Full range of motion without deformity. Skin: Skin color, texture, turgor normal.  No rashes or lesions. Neurologic:  Neurovascularly intact without any focal sensory/motor deficits. Cranial nerves: II-XII intact, grossly non-focal.  Psychiatric: Alert and oriented, thought content appropriate, normal insight  Capillary Refill: Brisk,< 3 seconds   Peripheral Pulses: +2 palpable, equal bilaterally       Labs:   Recent Labs      08/22/18   1100  08/22/18   1432  08/23/18   0009   WBC  32.6*  28.9*   --    HGB  6.6*  6.0*  5.2*   HCT  20.1*  18.3*  18.4*  15.7*   PLT  381  355   --      Recent Labs      08/22/18   1100   NA  136   K  3.7   CL  99   CO2  22   BUN  21*   CREATININE  <0.5*   CALCIUM  9.0     Recent Labs      08/22/18   1100   AST  17   ALT  19   BILITOT  2.2*   ALKPHOS  113     Recent Labs      08/22/18   1432   INR  1.03     No results for input(s): Fabiola Hardeep in the last 72 hours. Urinalysis:    Lab Results   Component Value Date    NITRU Negative 08/22/2018    WBCUA 3-5 08/22/2018    BACTERIA 1+ 08/22/2018    RBCUA 0-2 08/22/2018    BLOODU Negative 08/22/2018    SPECGRAV 1.025 08/22/2018    GLUCOSEU Negative 08/22/2018       Radiology:  CT Cervical Spine WO Contrast   Final Result   CT HEAD:      No acute intracranial abnormality identified by CT. CT CERVICAL SPINE:      1.   Known 6 mm pulmonary nodule at the right lung apex. This is equivocally   enlarged from 5/7/2018. Follow-up nonemergent outpatient CT chest would be   beneficial to assess for any interval change of the remaining known pulmonary   nodules since the 5/7/2018 examination. 2.   Hypodense lesion in the left thyroid lobe measuring up to 1.5 cm.    Further evaluation with thyroid ultrasound recommended according to guidelines provided below. 3.   No clear evidence for acute fracture or malalignment within the cervical   spine. RECOMMENDATIONS:   1.5 cm incidental thyroid nodule      Recommend thyroid US. Reference: J Am Rickey Radiol. 2015 Feb;12(2): 143-50         CT Head WO Contrast   Final Result   CT HEAD:      No acute intracranial abnormality identified by CT. CT CERVICAL SPINE:      1.   Known 6 mm pulmonary nodule at the right lung apex. This is equivocally   enlarged from 5/7/2018. Follow-up nonemergent outpatient CT chest would be   beneficial to assess for any interval change of the remaining known pulmonary   nodules since the 5/7/2018 examination. 2.   Hypodense lesion in the left thyroid lobe measuring up to 1.5 cm. Further evaluation with thyroid ultrasound recommended according to   guidelines provided below. 3.   No clear evidence for acute fracture or malalignment within the cervical   spine. RECOMMENDATIONS:   1.5 cm incidental thyroid nodule      Recommend thyroid US. Reference: J Am Rickey Radiol. 2015 Feb;12(2): 143-50         CT CHEST WO CONTRAST   Final Result   Waxing and waning pulmonary nodules, most likely infectious/inflammatory. Differential considerations include cryptogenic organizing pneumonia and   Wegener granulomatosis. Dominant left lung nodules appear new from May 2018. Several nodules appear   significantly decreased in size from December 2017      Mediastinal and hilar adenopathy remains.                  Assessment/Plan:    Active Hospital Problems    Diagnosis Date Noted    Anemia [D64.9] 08/22/2018     Priority: High    Leukocytosis [D72.829] 08/22/2018    Macrocytosis [D75.89] 08/22/2018    Hyperbilirubinemia [E80.6] 08/22/2018    Thyroid nodule [E04.1] 08/22/2018    Type 2 diabetes mellitus without complication, without long-term current use of insulin ([address] [E11.9] 08/22/2018    Current smoker [F17.200] 08/22/2018    Alcohol

## 2018-08-24 ENCOUNTER — APPOINTMENT (OUTPATIENT)
Dept: ULTRASOUND IMAGING | Age: 43
DRG: 810 | End: 2018-08-24
Payer: COMMERCIAL

## 2018-08-24 LAB
A/G RATIO: 2.1 (ref 1.1–2.2)
ALBUMIN SERPL-MCNC: 4.1 G/DL (ref 3.4–5)
ALP BLD-CCNC: 120 U/L (ref 40–129)
ALT SERPL-CCNC: 17 U/L (ref 10–40)
ANION GAP SERPL CALCULATED.3IONS-SCNC: 10 MMOL/L (ref 3–16)
ANISOCYTOSIS: ABNORMAL
AST SERPL-CCNC: 15 U/L (ref 15–37)
BANDED NEUTROPHILS RELATIVE PERCENT: 4 % (ref 0–7)
BANDED NEUTROPHILS RELATIVE PERCENT: 6 % (ref 0–7)
BASOPHILIC STIPPLING: ABNORMAL
BASOPHILS ABSOLUTE: 0 K/UL (ref 0–0.2)
BASOPHILS ABSOLUTE: 0 K/UL (ref 0–0.2)
BASOPHILS RELATIVE PERCENT: 0 %
BASOPHILS RELATIVE PERCENT: 0 %
BILIRUB SERPL-MCNC: 1.7 MG/DL (ref 0–1)
BLOOD BANK REF CASE: NORMAL
BUN BLDV-MCNC: 12 MG/DL (ref 7–20)
CALCIUM SERPL-MCNC: 8.9 MG/DL (ref 8.3–10.6)
CHLORIDE BLD-SCNC: 100 MMOL/L (ref 99–110)
CO2: 27 MMOL/L (ref 21–32)
CREAT SERPL-MCNC: <0.5 MG/DL (ref 0.6–1.1)
EOSINOPHILS ABSOLUTE: 0 K/UL (ref 0–0.6)
EOSINOPHILS ABSOLUTE: 1.8 K/UL (ref 0–0.6)
EOSINOPHILS RELATIVE PERCENT: 0 %
EOSINOPHILS RELATIVE PERCENT: 7 %
GFR AFRICAN AMERICAN: >60
GFR NON-AFRICAN AMERICAN: >60
GLOBULIN: 2 G/DL
GLUCOSE BLD-MCNC: 133 MG/DL (ref 70–99)
GLUCOSE BLD-MCNC: 137 MG/DL (ref 70–99)
GLUCOSE BLD-MCNC: 140 MG/DL (ref 70–99)
GLUCOSE BLD-MCNC: 242 MG/DL (ref 70–99)
GLUCOSE BLD-MCNC: 259 MG/DL (ref 70–99)
GLUCOSE BLD-MCNC: 268 MG/DL (ref 70–99)
HAV AB SERPL IA-ACNC: POSITIVE
HCT VFR BLD CALC: 27.6 % (ref 36–48)
HCT VFR BLD CALC: 27.8 % (ref 36–48)
HEMATOLOGY PATH CONSULT: NO
HEMATOLOGY PATH CONSULT: NO
HEMOGLOBIN: 9.4 G/DL (ref 12–16)
HEMOGLOBIN: 9.6 G/DL (ref 12–16)
HYPOCHROMIA: ABNORMAL
LACTATE DEHYDROGENASE: 531 U/L (ref 100–190)
LYMPHOCYTES ABSOLUTE: 3.6 K/UL (ref 1–5.1)
LYMPHOCYTES ABSOLUTE: 6.9 K/UL (ref 1–5.1)
LYMPHOCYTES RELATIVE PERCENT: 14 %
LYMPHOCYTES RELATIVE PERCENT: 27 %
MACROCYTES: ABNORMAL
MACROCYTES: ABNORMAL
MCH RBC QN AUTO: 34.3 PG (ref 26–34)
MCH RBC QN AUTO: 34.7 PG (ref 26–34)
MCHC RBC AUTO-ENTMCNC: 34.2 G/DL (ref 31–36)
MCHC RBC AUTO-ENTMCNC: 34.7 G/DL (ref 31–36)
MCV RBC AUTO: 101.5 FL (ref 80–100)
MCV RBC AUTO: 98.9 FL (ref 80–100)
METAMYELOCYTES RELATIVE PERCENT: 1 %
METAMYELOCYTES RELATIVE PERCENT: 2 %
MICROCYTES: ABNORMAL
MICROCYTES: ABNORMAL
MONOCYTES ABSOLUTE: 0.5 K/UL (ref 0–1.3)
MONOCYTES ABSOLUTE: 1 K/UL (ref 0–1.3)
MONOCYTES RELATIVE PERCENT: 2 %
MONOCYTES RELATIVE PERCENT: 4 %
NEUTROPHILS ABSOLUTE: 17.7 K/UL (ref 1.7–7.7)
NEUTROPHILS ABSOLUTE: 19.6 K/UL (ref 1.7–7.7)
NEUTROPHILS RELATIVE PERCENT: 62 %
NEUTROPHILS RELATIVE PERCENT: 71 %
NUCLEATED RED BLOOD CELLS: 2 /100 WBC
NUCLEATED RED BLOOD CELLS: 3 /100 WBC
PDW BLD-RTO: 27.8 % (ref 12.4–15.4)
PDW BLD-RTO: 29.5 % (ref 12.4–15.4)
PERFORMED ON: ABNORMAL
PLATELET # BLD: 284 K/UL (ref 135–450)
PLATELET # BLD: 317 K/UL (ref 135–450)
PLATELET SLIDE REVIEW: ADEQUATE
PMV BLD AUTO: 7.1 FL (ref 5–10.5)
PMV BLD AUTO: 7.1 FL (ref 5–10.5)
POLYCHROMASIA: ABNORMAL
POLYCHROMASIA: ABNORMAL
POTASSIUM SERPL-SCNC: 4 MMOL/L (ref 3.5–5.1)
RBC # BLD: 2.72 M/UL (ref 4–5.2)
RBC # BLD: 2.81 M/UL (ref 4–5.2)
SLIDE REVIEW: ABNORMAL
SODIUM BLD-SCNC: 137 MMOL/L (ref 136–145)
SOLUBLE TRANSFERRIN RECEPT: 6.1 MG/L (ref 1.9–4.4)
STOMATOCYTES: ABNORMAL
TOTAL PROTEIN: 6.1 G/DL (ref 6.4–8.2)
TOXIC GRANULATION: PRESENT
WBC # BLD: 25.4 K/UL (ref 4–11)
WBC # BLD: 25.6 K/UL (ref 4–11)

## 2018-08-24 PROCEDURE — 2500000003 HC RX 250 WO HCPCS: Performed by: INTERNAL MEDICINE

## 2018-08-24 PROCEDURE — 1200000000 HC SEMI PRIVATE

## 2018-08-24 PROCEDURE — 83615 LACTATE (LD) (LDH) ENZYME: CPT

## 2018-08-24 PROCEDURE — 80053 COMPREHEN METABOLIC PANEL: CPT

## 2018-08-24 PROCEDURE — 36415 COLL VENOUS BLD VENIPUNCTURE: CPT

## 2018-08-24 PROCEDURE — 2580000003 HC RX 258: Performed by: INTERNAL MEDICINE

## 2018-08-24 PROCEDURE — 85025 COMPLETE CBC W/AUTO DIFF WBC: CPT

## 2018-08-24 PROCEDURE — 6370000000 HC RX 637 (ALT 250 FOR IP): Performed by: INTERNAL MEDICINE

## 2018-08-24 PROCEDURE — 99232 SBSQ HOSP IP/OBS MODERATE 35: CPT | Performed by: INTERNAL MEDICINE

## 2018-08-24 PROCEDURE — 2580000003 HC RX 258: Performed by: PHYSICIAN ASSISTANT

## 2018-08-24 PROCEDURE — 76536 US EXAM OF HEAD AND NECK: CPT

## 2018-08-24 RX ORDER — INSULIN GLARGINE 100 [IU]/ML
15 INJECTION, SOLUTION SUBCUTANEOUS NIGHTLY
Status: DISCONTINUED | OUTPATIENT
Start: 2018-08-24 | End: 2018-08-25 | Stop reason: HOSPADM

## 2018-08-24 RX ORDER — DOXYCYCLINE HYCLATE 100 MG
100 TABLET ORAL EVERY 12 HOURS SCHEDULED
Status: DISCONTINUED | OUTPATIENT
Start: 2018-08-24 | End: 2018-08-25 | Stop reason: HOSPADM

## 2018-08-24 RX ADMIN — PREDNISONE 80 MG: 20 TABLET ORAL at 08:33

## 2018-08-24 RX ADMIN — INSULIN LISPRO 2 UNITS: 100 INJECTION, SOLUTION INTRAVENOUS; SUBCUTANEOUS at 12:34

## 2018-08-24 RX ADMIN — INSULIN LISPRO 2 UNITS: 100 INJECTION, SOLUTION INTRAVENOUS; SUBCUTANEOUS at 20:09

## 2018-08-24 RX ADMIN — SODIUM CHLORIDE, PRESERVATIVE FREE 10 ML: 5 INJECTION INTRAVENOUS at 12:11

## 2018-08-24 RX ADMIN — FAMOTIDINE 20 MG: 20 TABLET ORAL at 20:08

## 2018-08-24 RX ADMIN — ESCITALOPRAM OXALATE 10 MG: 10 TABLET ORAL at 08:33

## 2018-08-24 RX ADMIN — DOXYCYCLINE 100 MG: 100 INJECTION, POWDER, LYOPHILIZED, FOR SOLUTION INTRAVENOUS at 06:50

## 2018-08-24 RX ADMIN — FOLIC ACID 1 MG: 1 TABLET ORAL at 08:33

## 2018-08-24 RX ADMIN — FAMOTIDINE 20 MG: 20 TABLET ORAL at 08:33

## 2018-08-24 RX ADMIN — SODIUM CHLORIDE: 9 INJECTION, SOLUTION INTRAVENOUS at 06:50

## 2018-08-24 RX ADMIN — INSULIN LISPRO 3 UNITS: 100 INJECTION, SOLUTION INTRAVENOUS; SUBCUTANEOUS at 18:01

## 2018-08-24 RX ADMIN — DOXYCYCLINE HYCLATE 100 MG: 100 TABLET, COATED ORAL at 18:28

## 2018-08-24 RX ADMIN — INSULIN GLARGINE 15 UNITS: 100 INJECTION, SOLUTION SUBCUTANEOUS at 20:09

## 2018-08-24 ASSESSMENT — PAIN SCALES - GENERAL
PAINLEVEL_OUTOF10: 5
PAINLEVEL_OUTOF10: 2
PAINLEVEL_OUTOF10: 0
PAINLEVEL_OUTOF10: 2
PAINLEVEL_OUTOF10: 0

## 2018-08-24 ASSESSMENT — PAIN DESCRIPTION - ORIENTATION
ORIENTATION: RIGHT
ORIENTATION: RIGHT

## 2018-08-24 ASSESSMENT — PAIN DESCRIPTION - LOCATION
LOCATION: ARM
LOCATION: ARM

## 2018-08-24 ASSESSMENT — PAIN DESCRIPTION - PAIN TYPE
TYPE: ACUTE PAIN
TYPE: ACUTE PAIN

## 2018-08-24 NOTE — PROGRESS NOTES
Assessment complete. VSS. No complaints at this time. Denies further needs. Will continue to monitor.

## 2018-08-24 NOTE — PROGRESS NOTES
non-distended with normal bowel sounds. Musculoskeletal: No clubbing, cyanosis or edema bilaterally. Full range of motion without deformity. Skin: Skin color, texture, turgor normal.  No rashes or lesions. Neurologic:  Neurovascularly intact without any focal sensory/motor deficits. Cranial nerves: II-XII intact, grossly non-focal.  Psychiatric: Alert and oriented, thought content appropriate, normal insight  Capillary Refill: Brisk,< 3 seconds   Peripheral Pulses: +2 palpable, equal bilaterally       Labs:   Recent Labs      08/22/18   1432  08/23/18   0009  08/23/18   1505  08/24/18   0743   WBC  28.9*   --   25.4*  25.6*   HGB  6.0*  5.2*  9.6*  9.4*   HCT  18.3*  18.4*  15.7*  27.8*  27.6*   PLT  355   --   317  284     Recent Labs      08/22/18   1100  08/23/18   1505  08/24/18   0743   NA  136  137  137   K  3.7  4.3  4.0   CL  99  101  100   CO2  22  22  27   BUN  21*  12  12   CREATININE  <0.5*  <0.5*  <0.5*   CALCIUM  9.0  8.9  8.9     Recent Labs      08/22/18   1100  08/23/18   1505  08/24/18   0743   AST  17  18  15   ALT  19  19  17   BILITOT  2.2*  2.3*  1.7*   ALKPHOS  113  116  120     Recent Labs      08/22/18   1432   INR  1.03     No results for input(s): CKTOTAL, TROPONINI in the last 72 hours. Urinalysis:      Lab Results   Component Value Date    NITRU Negative 08/22/2018    WBCUA 3-5 08/22/2018    BACTERIA 1+ 08/22/2018    RBCUA 0-2 08/22/2018    BLOODU Negative 08/22/2018    SPECGRAV 1.025 08/22/2018    GLUCOSEU Negative 08/22/2018       Radiology:  US THYROID   Final Result   Solid 27 mm sized left thyroid lobe nodule is demonstrated. Ultrasound-guided fine-needle aspiration is recommended utilizing guidelines   below. NODULE left 1: ACR TI-RADS TR3:      Recommend:  Ultrasound-guided fine needle aspiration.       ACR TI-RADS recommendations:      TR5 (>= 7 points):  FNA if >= 1 cm; follow-up if 0.5-0.9 cm in 1, 2, 3, 4,   and 5 years      TR4 (4-6 points):  FNA if >= 1.5 cm; follow-up if 1.0-1.4 cm in 1, 2, 3, and   5 years      TR3 (3 points):  FNA if >= 2.5 cm; follow-up if 1.5-2.4 cm in 1, 3, and 5   years      TR2 (2 points):  No FNA or follow-up      TR1 (0 points):  No FNA or follow-up      ACR TI-RADS recommends that no more than two nodules with the highest ACR   TI-RADS point total should be biopsied and no more than four nodules should   be followed. CT Cervical Spine WO Contrast   Final Result   CT HEAD:      No acute intracranial abnormality identified by CT. CT CERVICAL SPINE:      1.   Known 6 mm pulmonary nodule at the right lung apex. This is equivocally   enlarged from 5/7/2018. Follow-up nonemergent outpatient CT chest would be   beneficial to assess for any interval change of the remaining known pulmonary   nodules since the 5/7/2018 examination. 2.   Hypodense lesion in the left thyroid lobe measuring up to 1.5 cm. Further evaluation with thyroid ultrasound recommended according to   guidelines provided below. 3.   No clear evidence for acute fracture or malalignment within the cervical   spine. RECOMMENDATIONS:   1.5 cm incidental thyroid nodule      Recommend thyroid US. Reference: J Am Rickey Radiol. 2015 Feb;12(2): 143-50         CT Head WO Contrast   Final Result   CT HEAD:      No acute intracranial abnormality identified by CT. CT CERVICAL SPINE:      1.   Known 6 mm pulmonary nodule at the right lung apex. This is equivocally   enlarged from 5/7/2018. Follow-up nonemergent outpatient CT chest would be   beneficial to assess for any interval change of the remaining known pulmonary   nodules since the 5/7/2018 examination. 2.   Hypodense lesion in the left thyroid lobe measuring up to 1.5 cm. Further evaluation with thyroid ultrasound recommended according to   guidelines provided below. 3.   No clear evidence for acute fracture or malalignment within the cervical   spine.       RECOMMENDATIONS:   1.5 cm Hematology following.     Multiple pulmonary nodules, dermatologic, patient was diagnosed and had a full workup including bronchoscopy done in December 2017 by Dr. Randy Sinha, consultation obtained from pulmonary group, further management as per pulmonary. Testing initiated with sed rate, MACK and rheumatoid factor to rule out any autoimmune process. Pulmonology following. Concern was raised for possible mycoplasma, patient was started on doxycycline. Serology pending.     Leukocytosis, unknown etiology, less likely infectious, monitor. Oncology following. Thyroid Nodule, incidental, suggest close follow up as outpatient, USG as outpatient.     Tension headache, CT head done in emergency room was unremarkable, no focal neurologic deficit noted, symptomatic management. Clinically improving.      Diabetes mellitus type 2, controlled with metformin according to patient, metformin was discontinued since it could give hemolytic anemia, patient was started on insulin and Lantus, patient will likely need Lantus and sliding scale insulin and going home due to high dose of prednisone.     DVT Prophylaxis: SCD  Diet: DIET CARB CONTROL;  Code Status: Full Code    PT/OT Eval Status: Ambulatory    Dispo - 1-2 days    Katina Tavares MD

## 2018-08-24 NOTE — PROGRESS NOTES
28.9*   --   25.4*  25.6*   HGB  6.0*  5.2*  9.6*  9.4*   HCT  18.3*  18.4*  15.7*  27.8*  27.6*   MCV  114.6*   --   98.9  101.5*   PLT  355   --   317  284     BMP:   Recent Labs      08/22/18   1100  08/23/18   1505  08/24/18   0743   NA  136  137  137   K  3.7  4.3  4.0   CL  99  101  100   CO2  22  22  27   BUN  21*  12  12   CREATININE  <0.5*  <0.5*  <0.5*     LIVER PROFILE:   Recent Labs      08/22/18   1100  08/23/18   1505  08/24/18   0743   AST  17  18  15   ALT  19  19  17   BILITOT  2.2*  2.3*  1.7*   ALKPHOS  113  116  120     PT/INR:   Recent Labs      08/22/18   1432   PROTIME  11.7   INR  1.03     APTT:   Recent Labs      08/22/18   1432   APTT  23.9*     UA:  Recent Labs      08/22/18   1216   COLORU  Yellow   PHUR  6.0   LABCAST  1-3 Hyaline*   WBCUA  3-5   RBCUA  0-2   BACTERIA  1+*   CLARITYU  Clear   SPECGRAV  1.025   LEUKOCYTESUR  TRACE*   UROBILINOGEN  0.2   BILIRUBINUR  SMALL*   BLOODU  Negative   GLUCOSEU  Negative       Imaging:  I have reviewed radiology images personally. CT Cervical Spine WO Contrast   Final Result   CT HEAD:      No acute intracranial abnormality identified by CT. CT CERVICAL SPINE:      1.   Known 6 mm pulmonary nodule at the right lung apex. This is equivocally   enlarged from 5/7/2018. Follow-up nonemergent outpatient CT chest would be   beneficial to assess for any interval change of the remaining known pulmonary   nodules since the 5/7/2018 examination. 2.   Hypodense lesion in the left thyroid lobe measuring up to 1.5 cm. Further evaluation with thyroid ultrasound recommended according to   guidelines provided below. 3.   No clear evidence for acute fracture or malalignment within the cervical   spine. RECOMMENDATIONS:   1.5 cm incidental thyroid nodule      Recommend thyroid US. Reference: J Am Rickey Radiol.  2015 Feb;12(2): 143-50         CT Head WO Contrast   Final Result   CT HEAD:      No acute intracranial abnormality identified by CT. CT CERVICAL SPINE:      1.   Known 6 mm pulmonary nodule at the right lung apex. This is equivocally   enlarged from 5/7/2018. Follow-up nonemergent outpatient CT chest would be   beneficial to assess for any interval change of the remaining known pulmonary   nodules since the 5/7/2018 examination. 2.   Hypodense lesion in the left thyroid lobe measuring up to 1.5 cm. Further evaluation with thyroid ultrasound recommended according to   guidelines provided below. 3.   No clear evidence for acute fracture or malalignment within the cervical   spine. RECOMMENDATIONS:   1.5 cm incidental thyroid nodule      Recommend thyroid US. Reference: J Am Rickey Radiol. 2015 Feb;12(2): 143-50         CT CHEST WO CONTRAST   Final Result   Waxing and waning pulmonary nodules, most likely infectious/inflammatory. Differential considerations include cryptogenic organizing pneumonia and   Wegener granulomatosis. Dominant left lung nodules appear new from May 2018. Several nodules appear   significantly decreased in size from December 2017      Mediastinal and hilar adenopathy remains. US THYROID    (Results Pending)     Results for Trung Arias (MRN [id number]) as of 8/24/2018 09:43   Ref.  Range 8/23/2018 15:05 8/23/2018 16:46 8/23/2018 19:57 8/24/2018 05:41 8/24/2018 07:43   Sodium Latest Ref Range: 136 - 145 mmol/L 137    137   Potassium Latest Ref Range: 3.5 - 5.1 mmol/L 4.3    4.0   Chloride Latest Ref Range: 99 - 110 mmol/L 101    100   CO2 Latest Ref Range: 21 - 32 mmol/L 22    27   BUN Latest Ref Range: 7 - 20 mg/dL 12    12   Creatinine Latest Ref Range: 0.6 - 1.1 mg/dL <0.5 (L)    <0.5 (L)   Anion Gap Latest Ref Range: 3 - 16  14    10   GFR Non- Latest Ref Range: >60  >60    >60   GFR  Latest Ref Range: >60  >60    >60   Glucose Latest Ref Range: 70 - 99 mg/dL 248 (H)    133 (H)   POC Glucose Latest Ref Range: 70 - 99 mg/dl

## 2018-08-24 NOTE — ONCOLOGY
Hematology/Oncology Progress Note       Subjective:     Feels better. No headache. ROS:     Constitutional: Denies fever, sweats, weight loss. .     Eyes: No visual changes or diplopia. No scleral icterus. ENT: No Headaches, no hearing loss, no  vertigo. No mouth sores or sore throat. Cardiovascular: No chest pain, no dyspnea on exertion, no palpitations, no  loss of consciousness. Respiratory: No cough, no  wheezing, no dyspnea, no sputum production. No hemoptysis. .    Gastrointestinal: No abdominal pain, no appetite loss, no blood in stools. No change in bowel habits. Genitourinary: No dysuria, trouble voiding, or hematuria. Musculoskeletal:  Generalized weakness. No joint complaints. Integumentary: No rash or pruritis. Neurological: No headache, diplopia. No change in gait, balance, or coordination. No paresthesias. Endocrine: No temperature intolerance. No excessive thirst, fluid intake, or urination. Hematologic/Lymphatic: No abnormal bruising or ecchymoses, no blood clots or swollen lymph nodes. Allergic/Immunologic: No nasal congestion or hives. Objective:     Physical examination:     /80   Pulse 75   Temp 98.2 °F (36.8 °C) (Oral)   Resp 16   Ht 5' 4\" (1.626 m)   Wt 196 lb (88.9 kg)   LMP 08/16/2018   SpO2 98%   BMI 33.64 kg/m²     CONSTITUTIONAL: awake, alert, cooperative, no apparent distress. EYES:  Pupils equal, round and reactive to light, sclera non-icteric, conjunctiva normal  ENT:  Normocephalic, without obvious abnormality, atraumatic, sinuses nontender on palpation, external ears without lesions, oral pharynx with moist mucus membranes, no mucositis.   NECK:  Supple, symmetrical, trachea midline, no adenopathy, thyroid symmetric, not enlarged and no tenderness, skin normal  HEMATOLOGIC/LYMPHATICS:  no cervical lymphadenopathy, no supraclavicular lymphadenopathy, no axillary lymphadenopathy and no inguinal lymphadenopathy  BACK:  Symmetric, no curvature, spinous considerations include cryptogenic organizing pneumonia and   Wegener granulomatosis.       Dominant left lung nodules appear new from May 2018.  Several nodules appear   significantly decreased in size from December 2017       Mediastinal and hilar adenopathy remains. Impression:     Leukocytosis   Severe Anemia   Fatigue   Headache   Recent mechanical fall   Endometriosis - heavy menses   ADD   Type 2 DM   Hx of pulm lung nodules (waxing and waning in size), thoracic adenopathy   Smoker   Thyroid nodule   Elevated bilirubin     Assessment and Plan:     1) Leukocytosis with severe anemia  - ANAHI pos (IgG pos, C3 neg), abs retic 612 K/mcL (H),  U/L (H), hapto <10 mg/dL (L), t bili 2.2 mg/dL (H).  - B12 275 pg/mL, folate 8.58 ng/mL. - FOBT neg.  - Based on all of the above, would think Shaka Bautista is most likely. - Start Prednisone 1 mg/kg, Folate, B12, and transfuse least incompatible units of blood. - Would think that the leukocytosis and nRBCs are all a sign of a stressed marrow from a brisk hemolytic process. - Trend CBC and LDH.     2) Elevated Bilirubin  - From hemolysis   - Hepatitis studies neg so far.    3) Pulm nodules  - Has been followed by pulmonary with serial scans with stable nodules      4) Thyroid nodule  - Needs US follow up.   Will order while she is here.     5) Headache  - CT head negative   - Likely due to volume depletion, anemia   - No clinical signs of meningitis     Would not discharge until we are confident that her LDH and HGB are trending toward normal.        Emmanuel Hart MD  Medical Oncology/Hematology    [facility] - [address],  [place] [address]  Phone: [phone]  Fax: [phone]

## 2018-08-24 NOTE — PLAN OF CARE
Problem: Pain:  Goal: Control of acute pain  Control of acute pain   Outcome: Ongoing  Denies pain at this time. No c/o headache.

## 2018-08-25 VITALS
RESPIRATION RATE: 16 BRPM | HEIGHT: 64 IN | DIASTOLIC BLOOD PRESSURE: 84 MMHG | TEMPERATURE: 97.9 F | WEIGHT: 196 LBS | BODY MASS INDEX: 33.46 KG/M2 | HEART RATE: 79 BPM | SYSTOLIC BLOOD PRESSURE: 133 MMHG | OXYGEN SATURATION: 94 %

## 2018-08-25 PROBLEM — D59.11 WARM ANTIBODY HEMOLYTIC ANEMIA (HCC): Status: ACTIVE | Noted: 2018-08-23

## 2018-08-25 LAB
A/G RATIO: 1.9 (ref 1.1–2.2)
ALBUMIN SERPL-MCNC: 3.7 G/DL (ref 3.4–5)
ALP BLD-CCNC: 92 U/L (ref 40–129)
ALT SERPL-CCNC: 17 U/L (ref 10–40)
ANION GAP SERPL CALCULATED.3IONS-SCNC: 8 MMOL/L (ref 3–16)
AST SERPL-CCNC: 10 U/L (ref 15–37)
BASOPHILS ABSOLUTE: 0.1 K/UL (ref 0–0.2)
BASOPHILS RELATIVE PERCENT: 0.3 %
BILIRUB SERPL-MCNC: 1.1 MG/DL (ref 0–1)
BUN BLDV-MCNC: 16 MG/DL (ref 7–20)
CALCIUM SERPL-MCNC: 8.7 MG/DL (ref 8.3–10.6)
CHLORIDE BLD-SCNC: 102 MMOL/L (ref 99–110)
CO2: 28 MMOL/L (ref 21–32)
CREAT SERPL-MCNC: <0.5 MG/DL (ref 0.6–1.1)
EOSINOPHILS ABSOLUTE: 0.3 K/UL (ref 0–0.6)
EOSINOPHILS RELATIVE PERCENT: 1.6 %
GFR AFRICAN AMERICAN: >60
GFR NON-AFRICAN AMERICAN: >60
GLOBULIN: 2 G/DL
GLUCOSE BLD-MCNC: 129 MG/DL (ref 70–99)
GLUCOSE BLD-MCNC: 131 MG/DL (ref 70–99)
GLUCOSE BLD-MCNC: 170 MG/DL (ref 70–99)
GLUCOSE BLD-MCNC: 225 MG/DL (ref 70–99)
HCT VFR BLD CALC: 27.4 % (ref 36–48)
HEMATOLOGY PATH CONSULT: NO
HEMOGLOBIN: 9.3 G/DL (ref 12–16)
LACTATE DEHYDROGENASE: 363 U/L (ref 100–190)
LYMPHOCYTES ABSOLUTE: 5.7 K/UL (ref 1–5.1)
LYMPHOCYTES RELATIVE PERCENT: 28.8 %
MCH RBC QN AUTO: 35.1 PG (ref 26–34)
MCHC RBC AUTO-ENTMCNC: 34.1 G/DL (ref 31–36)
MCV RBC AUTO: 102.9 FL (ref 80–100)
MONOCYTES ABSOLUTE: 0.9 K/UL (ref 0–1.3)
MONOCYTES RELATIVE PERCENT: 4.7 %
NEUTROPHILS ABSOLUTE: 12.7 K/UL (ref 1.7–7.7)
NEUTROPHILS RELATIVE PERCENT: 64.6 %
PDW BLD-RTO: 31 % (ref 12.4–15.4)
PERFORMED ON: ABNORMAL
PLATELET # BLD: 270 K/UL (ref 135–450)
PMV BLD AUTO: 7.2 FL (ref 5–10.5)
POTASSIUM SERPL-SCNC: 3.9 MMOL/L (ref 3.5–5.1)
RBC # BLD: 2.67 M/UL (ref 4–5.2)
SODIUM BLD-SCNC: 138 MMOL/L (ref 136–145)
TOTAL PROTEIN: 5.7 G/DL (ref 6.4–8.2)
WBC # BLD: 19.7 K/UL (ref 4–11)

## 2018-08-25 PROCEDURE — 6370000000 HC RX 637 (ALT 250 FOR IP): Performed by: INTERNAL MEDICINE

## 2018-08-25 PROCEDURE — 36415 COLL VENOUS BLD VENIPUNCTURE: CPT

## 2018-08-25 PROCEDURE — 85025 COMPLETE CBC W/AUTO DIFF WBC: CPT

## 2018-08-25 PROCEDURE — 83615 LACTATE (LD) (LDH) ENZYME: CPT

## 2018-08-25 PROCEDURE — 80053 COMPREHEN METABOLIC PANEL: CPT

## 2018-08-25 RX ORDER — FOLIC ACID 1 MG/1
1 TABLET ORAL DAILY
Qty: 30 TABLET | Refills: 3 | Status: SHIPPED | OUTPATIENT
Start: 2018-08-26 | End: 2019-06-24 | Stop reason: ALTCHOICE

## 2018-08-25 RX ORDER — PREDNISONE 20 MG/1
60 TABLET ORAL DAILY
Qty: 30 TABLET | Refills: 0 | Status: SHIPPED | OUTPATIENT
Start: 2018-08-26 | End: 2018-09-05

## 2018-08-25 RX ORDER — DOXYCYCLINE HYCLATE 100 MG
100 TABLET ORAL EVERY 12 HOURS SCHEDULED
Qty: 20 TABLET | Refills: 0 | Status: SHIPPED | OUTPATIENT
Start: 2018-08-25 | End: 2018-09-04

## 2018-08-25 RX ORDER — PREDNISONE 20 MG/1
80 TABLET ORAL DAILY
Qty: 40 TABLET | Refills: 0 | Status: SHIPPED | OUTPATIENT
Start: 2018-08-26 | End: 2018-08-25

## 2018-08-25 RX ADMIN — PREDNISONE 80 MG: 20 TABLET ORAL at 08:13

## 2018-08-25 RX ADMIN — ESCITALOPRAM OXALATE 10 MG: 10 TABLET ORAL at 08:13

## 2018-08-25 RX ADMIN — INSULIN LISPRO 2 UNITS: 100 INJECTION, SOLUTION INTRAVENOUS; SUBCUTANEOUS at 11:41

## 2018-08-25 RX ADMIN — FAMOTIDINE 20 MG: 20 TABLET ORAL at 08:13

## 2018-08-25 RX ADMIN — FOLIC ACID 1 MG: 1 TABLET ORAL at 08:13

## 2018-08-25 RX ADMIN — DOXYCYCLINE HYCLATE 100 MG: 100 TABLET, COATED ORAL at 08:13

## 2018-08-25 ASSESSMENT — PAIN SCALES - GENERAL: PAINLEVEL_OUTOF10: 0

## 2018-08-25 NOTE — ONCOLOGY
Hematology/Oncology Progress Note       Subjective: The patient states that she has no complaints and would like to go home. ROS:     Constitutional: Denies fever, sweats, weight loss. .     Eyes: No visual changes or diplopia. No scleral icterus. ENT: No Headaches, no hearing loss, no  vertigo. No mouth sores or sore throat. Cardiovascular: No chest pain, no dyspnea on exertion, no palpitations, no  loss of consciousness. Respiratory: No cough, no  wheezing, no dyspnea, no sputum production. No hemoptysis. .    Gastrointestinal: No abdominal pain, no appetite loss, no blood in stools. No change in bowel habits. Genitourinary: No dysuria, trouble voiding, or hematuria. Musculoskeletal:  Generalized weakness. No joint complaints. Integumentary: No rash or pruritis. Neurological: No headache, diplopia. No change in gait, balance, or coordination. No paresthesias. Endocrine: No temperature intolerance. No excessive thirst, fluid intake, or urination. Hematologic/Lymphatic: No abnormal bruising or ecchymoses, no blood clots or swollen lymph nodes. Allergic/Immunologic: No nasal congestion or hives. Objective:     Physical examination:     /84   Pulse 79   Temp 97.9 °F (36.6 °C) (Oral)   Resp 16   Ht 5' 4\" (1.626 m)   Wt 196 lb (88.9 kg)   LMP 08/16/2018   SpO2 94%   BMI 33.64 kg/m²     CONSTITUTIONAL: awake, alert, cooperative, no apparent distress. EYES:  Pupils equal, round and reactive to light, sclera non-icteric, conjunctiva normal  ENT:  Normocephalic, without obvious abnormality, atraumatic, sinuses nontender on palpation, external ears without lesions, oral pharynx with moist mucus membranes, no mucositis.   NECK:  Supple, symmetrical, trachea midline, no adenopathy, thyroid symmetric, not enlarged and no tenderness, skin normal  HEMATOLOGIC/LYMPHATICS:  no cervical lymphadenopathy, no supraclavicular lymphadenopathy, no axillary lymphadenopathy and no inguinal Oral, QAM  escitalopram (LEXAPRO) tablet 10 mg, 10 mg, Oral, Daily  butalbital-acetaminophen-caffeine (FIORICET, ESGIC) per tablet 1 tablet, 1 tablet, Oral, Q6H PRN  sodium chloride flush 0.9 % injection 10 mL, 10 mL, Intravenous, PRN  magnesium hydroxide (MILK OF MAGNESIA) 400 MG/5ML suspension 30 mL, 30 mL, Oral, Daily PRN  ondansetron (ZOFRAN) injection 4 mg, 4 mg, Intravenous, Q6H PRN  famotidine (PEPCID) tablet 20 mg, 20 mg, Oral, BID  predniSONE (DELTASONE) tablet 80 mg, 80 mg, Oral, Daily  glucose (GLUTOSE) 40 % oral gel 15 g, 15 g, Oral, PRN  dextrose 50 % solution 12.5 g, 12.5 g, Intravenous, PRN  glucagon (rDNA) injection 1 mg, 1 mg, Intramuscular, PRN  dextrose 5 % solution, 100 mL/hr, Intravenous, PRN  insulin lispro (HUMALOG) injection vial 0-6 Units, 0-6 Units, Subcutaneous, TID WC  insulin lispro (HUMALOG) injection vial 0-3 Units, 0-3 Units, Subcutaneous, Nightly  prochlorperazine (COMPAZINE) injection 10 mg, 10 mg, Intravenous, Q6H PRN    Imaging:     CT Head WO Contrast  And CT Cervical Spine WO Contrast (8/22/2018): Impression   CT HEAD:       No acute intracranial abnormality identified by CT.       CT CERVICAL SPINE:       1.   Known 6 mm pulmonary nodule at the right lung apex.  This is equivocally   enlarged from 5/7/2018.  Follow-up nonemergent outpatient CT chest would be   beneficial to assess for any interval change of the remaining known pulmonary   nodules since the 5/7/2018 examination.       2.   Hypodense lesion in the left thyroid lobe measuring up to 1.5 cm. Further evaluation with thyroid ultrasound recommended according to   guidelines provided below.       3.   No clear evidence for acute fracture or malalignment within the cervical   spine. CT Chest WO Contrast (8/22/2018): Impression   Waxing and waning pulmonary nodules, most likely infectious/inflammatory. Differential considerations include cryptogenic organizing pneumonia and   Wegener granulomatosis.      Dominant left lung nodules appear new from May 2018.  Several nodules appear   significantly decreased in size from December 2017       Mediastinal and hilar adenopathy remains. Impression:     Leukocytosis   Severe Anemia   Fatigue   Headache   Recent mechanical fall   Endometriosis - heavy menses   ADD   Type 2 DM   Hx of pulm lung nodules (waxing and waning in size), thoracic adenopathy   Smoker   Thyroid nodule   Elevated bilirubin     Assessment and Plan:     1) Leukocytosis with severe anemia  - ANAHI pos (IgG pos, C3 neg), abs retic 612 K/mcL (H),  U/L (H), hapto <10 mg/dL (L), t bili 2.2 mg/dL (H).  - B12 275 pg/mL, folate 8.58 ng/mL. - FOBT neg.  - Based on all of the above, would think Sabrina Arias is most likely. - Start Prednisone 1 mg/kg, Folate, B12, and transfuse least incompatible units of blood. - Would think that the leukocytosis and nRBCs are all a sign of a stressed marrow from a brisk hemolytic process.  -Her hemoglobin and hematocrit have been stable for 3 days and her LDH is dropping  -I think she can be discharged on 80 mg of prednisone p.o. daily     2) Elevated Bilirubin  -Hepatitis a antibody is positive  -This will be followed up as an outpatient     3) Pulm nodules  - Has been followed by pulmonary with serial scans with stable nodules      4) Thyroid nodule  - Needs US follow up.   Will order while she is here.     5) Headache  - CT head negative   - Likely due to volume depletion, anemia   - No clinical signs of meningitis     Oncologic disposition:  -Her hematocrit has been stable for 3 days  -Her LDH is trending downward  -She can be discharged on 80 mg of prednisone a day  -We will follow-up with her on Monday    Dulce Domingo MD  May be reached through [facility]  [address] [facility], [facility] [place] [address]  Phone: [phone]  Fax: [phone]

## 2018-08-26 NOTE — DISCHARGE SUMMARY
deformity. Pupils equal, round, and reactive to light. Extra ocular muscles intact. Conjunctivae/corneas clear. Neck: Supple, with full range of motion. No jugular venous distention. Trachea midline. Respiratory:  Normal respiratory effort. Clear to auscultation, bilaterally without Rales/Wheezes/Rhonchi. Cardiovascular:  Regular rate and rhythm with normal S1/S2 without murmurs, rubs or gallops. Abdomen: Soft, non-tender, non-distended with normal bowel sounds. Musculoskeletal:  No clubbing, cyanosis or edema bilaterally. Full range of motion without deformity. Skin: Skin color, texture, turgor normal.  No rashes or lesions. Neurologic:  Neurovascularly intact without any focal sensory/motor deficits. Cranial nerves: II-XII intact, grossly non-focal.  Psychiatric:  Alert and oriented, thought content appropriate, normal insight  Capillary Refill: Brisk,< 3 seconds   Peripheral Pulses: +2 palpable, equal bilaterally       Labs: For convenience and continuity at follow-up the following most recent labs are provided:      CBC:    Lab Results   Component Value Date    WBC 19.7 08/25/2018    HGB 9.3 08/25/2018    HCT 27.4 08/25/2018     08/25/2018       Renal:    Lab Results   Component Value Date     08/25/2018    K 3.9 08/25/2018     08/25/2018    CO2 28 08/25/2018    BUN 16 08/25/2018    CREATININE <0.5 08/25/2018    CALCIUM 8.7 08/25/2018         Significant Diagnostic Studies    Radiology:   US THYROID   Final Result   Solid 27 mm sized left thyroid lobe nodule is demonstrated. Ultrasound-guided fine-needle aspiration is recommended utilizing guidelines   below. NODULE left 1: ACR TI-RADS TR3:      Recommend:  Ultrasound-guided fine needle aspiration.       ACR TI-RADS recommendations:      TR5 (>= 7 points):  FNA if >= 1 cm; follow-up if 0.5-0.9 cm in 1, 2, 3, 4,   and 5 years      TR4 (4-6 points):  FNA if >= 1.5 cm; follow-up if 1.0-1.4 cm in 1, 2, 3, and   5 years      TR3 (3 points):  FNA if >= 2.5 cm; follow-up if 1.5-2.4 cm in 1, 3, and 5   years      TR2 (2 points):  No FNA or follow-up      TR1 (0 points):  No FNA or follow-up      ACR TI-RADS recommends that no more than two nodules with the highest ACR   TI-RADS point total should be biopsied and no more than four nodules should   be followed. CT Cervical Spine WO Contrast   Final Result   CT HEAD:      No acute intracranial abnormality identified by CT. CT CERVICAL SPINE:      1.   Known 6 mm pulmonary nodule at the right lung apex. This is equivocally   enlarged from 5/7/2018. Follow-up nonemergent outpatient CT chest would be   beneficial to assess for any interval change of the remaining known pulmonary   nodules since the 5/7/2018 examination. 2.   Hypodense lesion in the left thyroid lobe measuring up to 1.5 cm. Further evaluation with thyroid ultrasound recommended according to   guidelines provided below. 3.   No clear evidence for acute fracture or malalignment within the cervical   spine. RECOMMENDATIONS:   1.5 cm incidental thyroid nodule      Recommend thyroid US. Reference: J Am Rickey Radiol. 2015 Feb;12(2): 143-50         CT Head WO Contrast   Final Result   CT HEAD:      No acute intracranial abnormality identified by CT. CT CERVICAL SPINE:      1.   Known 6 mm pulmonary nodule at the right lung apex. This is equivocally   enlarged from 5/7/2018. Follow-up nonemergent outpatient CT chest would be   beneficial to assess for any interval change of the remaining known pulmonary   nodules since the 5/7/2018 examination. 2.   Hypodense lesion in the left thyroid lobe measuring up to 1.5 cm. Further evaluation with thyroid ultrasound recommended according to   guidelines provided below. 3.   No clear evidence for acute fracture or malalignment within the cervical   spine. RECOMMENDATIONS:   1.5 cm incidental thyroid nodule      Recommend thyroid US. Reference: J Am Rickey Radiol. 2015 Feb;12(2): 143-50         CT CHEST WO CONTRAST   Final Result   Waxing and waning pulmonary nodules, most likely infectious/inflammatory. Differential considerations include cryptogenic organizing pneumonia and   Wegener granulomatosis. Dominant left lung nodules appear new from May 2018. Several nodules appear   significantly decreased in size from December 2017      Mediastinal and hilar adenopathy remains. Consults:     IP CONSULT TO HOSPITALIST  IP CONSULT TO ONCOLOGY  IP CONSULT TO PULMONOLOGY    Disposition:  Home     Condition at Discharge: Stable    Discharge Instructions/Follow-up:    Future Appointments  Date Time Provider Jaswinder Mcfadden   11/21/2018 7:30 AM MHC CT [facility]   11/26/2018 8:30 AM MD MICHEL Gilman [facility]   \      Code Status:  Full code    Activity: activity as tolerated    Diet: diabetic diet      Discharge Medications:     Discharge Medication List as of 8/25/2018  1:58 PM           Details   folic acid (FOLVITE) 1 MG tablet Take 1 tablet by mouth daily, Disp-30 tablet, R-3Normal      doxycycline hyclate (VIBRA-TABS) 100 MG tablet Take 1 tablet by mouth every 12 hours for 10 days, Disp-20 tablet, R-0Normal              Details   predniSONE (DELTASONE) 20 MG tablet Take 3 tablets by mouth daily for 10 days, Disp-30 tablet, R-0Normal              Details   butalbital-APAP-caffeine (FIORICET) -40 MG CAPS per capsule Take 1 capsule by mouth every 6 hours as needed for Headaches, Disp-20 capsule, R-0Print      lisdexamfetamine (VYVANSE) 70 MG capsule Take 70 mg by mouth every morning . Historical Med      metFORMIN (GLUCOPHAGE) 1000 MG tablet Take 1,000 mg by mouth 2 times daily (with meals). escitalopram (LEXAPRO) 10 MG tablet Take 10 mg by mouth daily.              Time Spent on discharge is more than 45 minutes in the examination, evaluation, counseling and review of medications and discharge

## 2018-08-29 LAB — REPORT: NORMAL

## 2018-11-21 ENCOUNTER — HOSPITAL ENCOUNTER (OUTPATIENT)
Dept: CT IMAGING | Age: 43
Discharge: HOME | End: 2018-11-21
Payer: COMMERCIAL

## 2018-11-21 DIAGNOSIS — R91.8 OTHER NONSPECIFIC ABNORMAL FINDING OF LUNG FIELD (CODE): ICD-10-CM

## 2018-11-21 DIAGNOSIS — R91.8 LUNG NODULES: ICD-10-CM

## 2018-11-21 PROCEDURE — 71250 CT THORAX DX C-: CPT

## 2018-11-26 ENCOUNTER — OFFICE VISIT (OUTPATIENT)
Dept: PULMONOLOGY | Age: 43
End: 2018-11-26
Payer: COMMERCIAL

## 2018-11-26 VITALS
OXYGEN SATURATION: 98 % | SYSTOLIC BLOOD PRESSURE: 136 MMHG | BODY MASS INDEX: 34.18 KG/M2 | DIASTOLIC BLOOD PRESSURE: 84 MMHG | WEIGHT: 200.2 LBS | HEIGHT: 64 IN | RESPIRATION RATE: 18 BRPM | HEART RATE: 92 BPM | TEMPERATURE: 98.1 F

## 2018-11-26 DIAGNOSIS — R91.8 LUNG NODULES: Primary | ICD-10-CM

## 2018-11-26 DIAGNOSIS — R06.02 SOB (SHORTNESS OF BREATH): ICD-10-CM

## 2018-11-26 DIAGNOSIS — R59.0 MEDIASTINAL ADENOPATHY: ICD-10-CM

## 2018-11-26 PROCEDURE — 99213 OFFICE O/P EST LOW 20 MIN: CPT | Performed by: INTERNAL MEDICINE

## 2018-11-26 RX ORDER — PREDNISONE 10 MG/1
10 TABLET ORAL DAILY
COMMUNITY
End: 2019-06-24 | Stop reason: ALTCHOICE

## 2018-11-26 NOTE — PROGRESS NOTES
Past Surgical History:        Procedure Laterality Date    BREAST SURGERY  10/06    bilat breast augmentation    BRONCHOSCOPY  2017    EBUS and Bronch for Abnormal CT Scan     SECTION  2009    x2  and     COLONOSCOPY  14    LASIK      LEEP      dysplasia    NASAL SEPTUM SURGERY      TONSILLECTOMY AND ADENOIDECTOMY      WISDOM TOOTH EXTRACTION         Allergies:  is allergic to morphine; vicoprofen [hydrocodone-ibuprofen]; and doxycycline hyclate. Social History:    TOBACCO:   reports that she has been smoking. She has been smoking about 0.25 packs per day. She has never used smokeless tobacco.  ETOH:   reports that she drinks alcohol. Patient currently lives independently      Family History:   Family History   Problem Relation Age of Onset    Diabetes Sister     Cancer Maternal Uncle         metastatic malig of unknown primary    Diabetes Paternal Uncle     Cancer Paternal Grandfather         prostate    Cancer Maternal Uncle         colon       Current Medications:    Current Outpatient Prescriptions:     predniSONE (DELTASONE) 10 MG tablet, Take 10 mg by mouth daily, Disp: , Rfl:     folic acid (FOLVITE) 1 MG tablet, Take 1 tablet by mouth daily, Disp: 30 tablet, Rfl: 3    lisdexamfetamine (VYVANSE) 70 MG capsule, Take 70 mg by mouth every morning ., Disp: , Rfl:     metFORMIN (GLUCOPHAGE) 1000 MG tablet, Take 1,000 mg by mouth 2 times daily (with meals). , Disp: , Rfl:     escitalopram (LEXAPRO) 10 MG tablet, Take 20 mg by mouth daily , Disp: , Rfl:     butalbital-APAP-caffeine (FIORICET) -40 MG CAPS per capsule, Take 1 capsule by mouth every 6 hours as needed for Headaches, Disp: 20 capsule, Rfl: 0      Objective:   PHYSICAL EXAM:        VITALS:  /84 (Site: Left Upper Arm, Position: Sitting, Cuff Size: Large Adult)   Pulse 92   Temp 98.1 °F (36.7 °C) (Oral)   Resp 18   Ht 5' 4\" (1.626 m)   Wt 200 lb 3.2 oz (90.8 kg) large conglomerate right peritracheal region previously  measuring 2.2 x 1.8 cm is of similar size as compared to prior.  A superior  mediastinal lymph node to the left of the trachea that currently measures 1.6  x 1.6 cm had measured 1.3 x 1.3 cm on prior.  Additional nodes are seen which  are similar in size to prior.  No evidence of axillary adenopathy.  Bilateral  breast implants are seen.   Lungs/pleura: Numerous noncalcified pulmonary nodules are again demonstrated  within the lungs bilaterally.  The largest nodule is again seen within the  right lower lobe measuring 2.0 x 1.7 cm, not markedly changed from prior.   A previously described medial left lower lobe nodule on image 85 that had  measured 8 x 7 mm currently measures 5 x 5 mm.   A previously described medial right lower lobe nodule on image 66 currently  measuring 5 x 3 mm had measured 6 x 6 mm on prior.   An additional 9 mm previously demonstrated right lower lobe nodule has  decreased in size.  Innumerable additional small subcentimeter noncalcified  pulmonary nodules are again noted.  No evidence of pneumothorax.  No evidence  of pleural effusion. Chest CT(dated 12/4/17): Mediastinum: Multiple enlarged lymph nodes are identified.  The largest  conglomerate is in the right paratracheal space measuring 2.2 x 1.8 cm (image  number 36).  No axillary lymphadenopathy is identified.  Bilateral breast  prostheses noted. Lungs/pleura: Multiple suspicious-appearing nodules are identified within the  lungs bilaterally.  The largest is seen within the right lower lobe measuring  2.0 x 1.5 cm (image number 78). Another example nodule is in the medial aspect of the right lower lobe  measuring 6 x 6 mm (image number 63). Another example nodule is seen within the left lower lobe medially measuring  8 x 7 mm (image number 82). Numerous smaller nodules are detected, none of which appear to contain  calcification.         ASSESSMENT AND PLAN:  Lung nodules  The etiology is unclear. DDX includes granulomatous disease such as histoplasmosis, sarcoidosis or less likely malignancy. Largest is 1.9 Cm in RLL- stable for 11 months  - Bronchoscopy did not reveal etiology  - sent urine histo antigen and fungal serologies - all negative  - referred to dermatology for biopsy of skin lesions- no granulomas     Mediastinal adenopathy  The etiology of the patient's adenopathy is unknown. DDx includes fungus, sarcoidosis or malignancy. No evidence of malignancy on EBUS guided biopsies of LNs.  - plan for repeat chest CT in 12 months (12/19) for 2 year stability     Shortness of breath  Minimal at present.   - consider Pfts in future if worsens

## 2018-12-12 ENCOUNTER — OFFICE VISIT (OUTPATIENT)
Dept: ORTHOPEDIC SURGERY | Age: 43
End: 2018-12-12
Payer: COMMERCIAL

## 2018-12-12 VITALS
BODY MASS INDEX: 34.18 KG/M2 | DIASTOLIC BLOOD PRESSURE: 81 MMHG | SYSTOLIC BLOOD PRESSURE: 132 MMHG | HEART RATE: 84 BPM | HEIGHT: 64 IN | WEIGHT: 200.18 LBS

## 2018-12-12 DIAGNOSIS — M25.511 RIGHT SHOULDER PAIN, UNSPECIFIED CHRONICITY: Primary | ICD-10-CM

## 2018-12-12 DIAGNOSIS — M75.41 IMPINGEMENT SYNDROME OF RIGHT SHOULDER: ICD-10-CM

## 2018-12-12 PROCEDURE — 99213 OFFICE O/P EST LOW 20 MIN: CPT | Performed by: PHYSICIAN ASSISTANT

## 2018-12-12 NOTE — PROGRESS NOTES
of the shoulder and subacromial space. No AC joint tenderness    Range of Motion:  She does have full range of motion but does have pain with forward elevation about 160° and functional internal rotation. Strength:  Rotator cuff strength is 4+/5 throughout of the supraspinatus and infraspinatus. 5/5 subscapularis. Biceps and triceps strength is 5/5. Special Tests:  Positive Whitten and Neer impingement exam.  Negative speed sign. Negative crossover examination. Skin: There are no rashes, ulcerations or lesions. Gait: Normal gait pattern    Reflex normal deep tendon reflexes    Additional Comments:       Additional Examinations:         Contralateral Exam: Examination of the left shoulder reveals no atrophy or deformity. Skin is warm and dry. Range of motion is within normal limits. There is no focal tenderness with palpation. No AC joint tenderness. Negative Neer and Whitten-Alban exams. Strength is graded 5/5 throughout. Neck: Examination of the neck does not show any tenderness, deformity or injury. Range of motion is unremarkable. There is no gross instability. There are no rashes, ulcerations or lesions. Strength and tone are normal.    Radiology:     X-rays obtained and reviewed in office:  Views 3 views including AP, Y, axillary  Location right shoulder  Impression there is a well-maintained glenohumeral joint with minimal arthritic changes. No fractures or dislocations. Impression:  Encounter Diagnoses   Name Primary?     Right shoulder pain, unspecified chronicity Yes    Impingement syndrome of right shoulder        Office Procedures:  Orders Placed This Encounter   Procedures    XR SHOULDER RIGHT (MIN 2 VIEWS)   Brian RAE  - [facility]     Referral Priority:   Routine     Referral Type:   Eval and Treat     Referral Reason:   Specialty Services Required     Requested Specialty:   Physical Therapy     Number of Visits Requested:   1       Treatment

## 2019-06-24 ENCOUNTER — OFFICE VISIT (OUTPATIENT)
Dept: ENDOCRINOLOGY | Age: 44
End: 2019-06-24
Payer: COMMERCIAL

## 2019-06-24 VITALS
HEART RATE: 106 BPM | OXYGEN SATURATION: 99 % | WEIGHT: 182.6 LBS | BODY MASS INDEX: 31.18 KG/M2 | DIASTOLIC BLOOD PRESSURE: 95 MMHG | HEIGHT: 64 IN | SYSTOLIC BLOOD PRESSURE: 147 MMHG

## 2019-06-24 DIAGNOSIS — E04.1 LEFT THYROID NODULE: Primary | ICD-10-CM

## 2019-06-24 PROCEDURE — 99243 OFF/OP CNSLTJ NEW/EST LOW 30: CPT | Performed by: INTERNAL MEDICINE

## 2019-06-24 NOTE — PROGRESS NOTES
metastatic malig of unknown primary    Diabetes Paternal Uncle     Cancer Paternal Grandfather         prostate    Cancer Maternal Uncle         colon     Social History     Tobacco Use   Smoking Status Current Every Day Smoker    Packs/day: 0.25    Last attempt to quit: 2010    Years since quittin.0   Smokeless Tobacco Never Used      Social History     Substance and Sexual Activity   Alcohol Use Yes    Comment: rarely       HPI    Erma Pichardo is a 45 y.o. female who is here for initial evaluation of thyroid nodule. Patient is being seen at the request of Dr. Morenita Friedman [facility])    PCP   Kiana Hernandez MD [facility] )     She previously saw [facility]. Patient has a PMH of DM,Warm antibody  hemolytic anemia., lung nodules. CT head in  showed left thyroid nodule. Patient had Thyroid US done in  which showed 2.7 cm dominant nodule in left lobe nodule. Obstructive symptoms: No difficulty swallowing. No FH of thyroid cancer  No History of exposure to radiation as a child. She has type 2 DM  On metformin 1000 mg BID    Hemolytic anemia. Warm antibody. Was on steroids. She works in insurance. Review of system Please see the scanned document filled by the patient, reviewed  by me today. Physical Exam   Constitutional: She is oriented to person, place, and time. She appears well-developed. No distress. HENT:   Mouth/Throat: Oropharynx is clear and moist.   Eyes: EOM are normal.   Neck: No thyromegaly present. Cardiovascular: Normal rate and normal heart sounds. Pulmonary/Chest: Effort normal. No respiratory distress. She has no wheezes. Abdominal: Soft. Bowel sounds are normal. There is no tenderness. Musculoskeletal: She exhibits no edema. Neurological: She is alert and oriented to person, place, and time. Skin: Skin is warm and dry. She is not diaphoretic.    Psychiatric: Her behavior is normal. Thought content normal.     EXAMINATION:   THYROID ULTRASOUND       8/24/2018       COMPARISON:   None.       HISTORY:   ORDERING SYSTEM PROVIDED HISTORY: THYROID DISEASE   TECHNOLOGIST PROVIDED HISTORY:   Ordering Physician Provided Reason for Exam: f/u to ct  thyroid nodule   Acuity: Unknown   Type of Exam: Subsequent/Follow-up       FINDINGS:   Right thyroid lobe:  5.1 x 1.3 x 1.3 cm       Left thyroid lobe:  5.2 x 1.7 x 0.6 cm       Isthmus:  2.3 mm       Thyroid Gland:  Thyroid gland demonstrates normal echotexture and vascularity.       Nodules: See below:       NODULE: Left 1       Size: 27 x 13 x 13 mm       Location: Left mid       1. Composition:  Solid (2)       2. Echogenicity:  Isoechoic (1)       3. Shape:  Wider-than-tall (0)       4. Margins:       5. Echogenic foci:  None (0)       ACR TI-RADS total points:  3       ACR TI-RADS risk category: TR3       Benign right thyroid cyst, 2 mm.       Cervical lymphadenopathy: No abnormal lymph nodes in the imaged portions of   the neck.           Impression   Solid 27 mm sized left thyroid lobe nodule is demonstrated. Ultrasound-guided fine-needle aspiration is recommended utilizing guidelines   below.       NODULE left 1: ACR TI-RADS TR3:       Recommend:  Ultrasound-guided fine needle aspiration.             Assessment/Plan      1. Left thyroid nodule    This 45 yrs old female was found to have a 2.7 cm left thyroid nodule. Reviewed US images in office today  Left lobe nodule is solid  No calcifications, increased vascularity noted. Given size of the nodule, FNA is recommended. Discussed alternatives    She agrees with the plan    -Will check her TSH, Free T4 today    -Will schedule an FNA of the left thyroid nodule at [facility]    Based on FNA results , will make further recommendations. 2. DM. As per PCP    3. Hemolytic Anemia. As per hematology. 4. Lung Nodules. Follows with Dr. Patrice Bains. 5. High BP. Normal at PCP's office. Advised to follow-up with PCP.

## 2019-06-25 LAB
T4 FREE: 0.88 NG/DL (ref 0.8–2)
TSH ULTRASENSITIVE: 1.52 MIU/L (ref 0.27–4.2)

## 2019-07-03 ENCOUNTER — HOSPITAL ENCOUNTER (OUTPATIENT)
Dept: ULTRASOUND IMAGING | Age: 44
Discharge: HOME | End: 2019-07-03
Payer: COMMERCIAL

## 2019-07-03 DIAGNOSIS — E04.1 LEFT THYROID NODULE: ICD-10-CM

## 2019-07-03 PROCEDURE — 88305 TISSUE EXAM BY PATHOLOGIST: CPT

## 2019-07-03 PROCEDURE — 2709999900 US FINE NEEDLE ASPIRATION

## 2019-07-03 PROCEDURE — 88173 CYTOPATH EVAL FNA REPORT: CPT

## 2019-07-06 DIAGNOSIS — E04.1 LEFT THYROID NODULE: Primary | ICD-10-CM

## 2019-07-08 ENCOUNTER — TELEPHONE (OUTPATIENT)
Dept: ENDOCRINOLOGY | Age: 44
End: 2019-07-08

## 2019-11-11 ENCOUNTER — TELEPHONE (OUTPATIENT)
Dept: PULMONOLOGY | Age: 44
End: 2019-11-11

## 2020-03-08 ENCOUNTER — HOSPITAL ENCOUNTER (EMERGENCY)
Age: 45
Discharge: HOME | End: 2020-03-08
Payer: COMMERCIAL

## 2020-03-08 ENCOUNTER — APPOINTMENT (OUTPATIENT)
Dept: GENERAL RADIOLOGY | Age: 45
End: 2020-03-08
Payer: COMMERCIAL

## 2020-03-08 VITALS
BODY MASS INDEX: 33.8 KG/M2 | TEMPERATURE: 98.7 F | HEART RATE: 85 BPM | RESPIRATION RATE: 16 BRPM | WEIGHT: 198 LBS | HEIGHT: 64 IN | OXYGEN SATURATION: 100 % | SYSTOLIC BLOOD PRESSURE: 146 MMHG | DIASTOLIC BLOOD PRESSURE: 97 MMHG

## 2020-03-08 PROCEDURE — 99283 EMERGENCY DEPT VISIT LOW MDM: CPT

## 2020-03-08 PROCEDURE — 73110 X-RAY EXAM OF WRIST: CPT

## 2020-03-08 ASSESSMENT — PAIN DESCRIPTION - DESCRIPTORS: DESCRIPTORS: ACHING

## 2020-03-08 ASSESSMENT — PAIN DESCRIPTION - LOCATION: LOCATION: WRIST;ARM;HAND

## 2020-03-08 ASSESSMENT — PAIN SCALES - GENERAL
PAINLEVEL_OUTOF10: 2
PAINLEVEL_OUTOF10: 2

## 2020-03-08 ASSESSMENT — PAIN DESCRIPTION - FREQUENCY: FREQUENCY: CONTINUOUS

## 2020-03-08 NOTE — ED PROVIDER NOTES
[address] [facility]        Pt Name: Catherine Hensley  MRN: [id number]  [place] 1975  Date of evaluation: 3/8/2020  Provider: ELKIN Campa  PCP: RENA Hogan CNP    This patient was not seen and evaluated by the attending physician No att. providers found. CHIEF COMPLAINT       Chief Complaint   Patient presents with    Wrist Injury     Bed fell on LEFT wrist Friday night while moving furniture, had pain and swelling, used ice and Ibuprophen with some relief, swelling decreasing but still having pain. HISTORY OF PRESENT ILLNESS   (Location/Symptom, Timing/Onset, Context/Setting, Quality, Duration, Modifying Factors, Severity)  Note limiting factors. Catherine Hensley is a 45 y.o. female who presents via private vehicle for evaluation of left wrist injury. Patient notes on Friday she is rearranging her daughter's bedroom furniture, was moving the metal bed frame when it fell against the wall, she notes that hit the ulnar aspect of her left wrist and she also strained her wrist in the process of trying to keep it from falling to the ground. She denies any cuts or scrapes. She has been elevating it and icing it taking Motrin but she is not had significant improvement in the pain therefore her presentation to the emergency department. She denies any numbness tingling or weakness but she endorses pain with radial and ulnar deviation at the wrist.  She denies any pain at the elbow or the shoulder. She denies past medical history of injury to this wrist or hand in the past.     Nursing Notes were all reviewed and agreed with or any disagreements were addressed  in the HPI. REVIEW OF SYSTEMS    (2-9 systems for level 4, 10 or more for level 5)     Review of Systems    Positives and Pertinent negatives as per HPI. Except as noted abovein the ROS, all other systems were reviewed and negative.        PAST MEDICAL HISTORY Past Medical History:   Diagnosis Date    [facility] mellitus [facility] [address]     Warm antibody hemolytic anemia ([facility] [address] 2018         SURGICAL HISTORY     Past Surgical History:   Procedure Laterality Date    BREAST SURGERY  10/06    bilat breast augmentation    BRONCHOSCOPY  2017    EBUS and Bronch for Abnormal CT Scan     SECTION  2009    x2  and     COLONOSCOPY  14    LASIK      LEEP      dysplasia    NASAL SEPTUM SURGERY      TONSILLECTOMY AND ADENOIDECTOMY      WISDOM TOOTH EXTRACTION           CURRENTMEDICATIONS       Previous Medications    ESCITALOPRAM (LEXAPRO) 10 MG TABLET    Take 20 mg by mouth daily     LISDEXAMFETAMINE (VYVANSE) 70 MG CAPSULE    Take 70 mg by mouth every morning . METFORMIN (GLUCOPHAGE) 1000 MG TABLET    Take 1,000 mg by mouth 2 times daily (with meals). ALLERGIES     Morphine; Vicoprofen [hydrocodone-ibuprofen]; and Doxycycline hyclate    FAMILYHISTORY       Family History   Problem Relation Age of Onset    Diabetes Sister     Cancer Maternal Uncle         metastatic malig of unknown primary    Diabetes Paternal Uncle     Cancer Paternal Grandfather         prostate    Cancer Maternal Uncle         colon          SOCIAL HISTORY       Social History     Socioeconomic History    Marital status:      Spouse name: Not on file    Number of children: Not on file    Years of education: Not on file    Highest education level: Not on file   Occupational History    Not on file   Social Needs    Financial resource strain: Not on file    Food insecurity     Worry: Not on file     Inability: Not on file    Transportation needs     Medical: Not on file     Non-medical: Not on file   Tobacco Use    Smoking status: Current Every Day Smoker     Packs/day: 0.25     Last attempt to quit: 2010     Years since quittin.7    Smokeless tobacco: Never Used   Substance and Sexual Activity    Alcohol use:  Yes Xr Wrist Left (min 3 Views)    Result Date: 3/8/2020  EXAMINATION: 3 XRAY VIEWS OF THE LEFT WRIST 3/8/2020 1:42 pm COMPARISON: None. HISTORY: ORDERING SYSTEM PROVIDED HISTORY: Injury TECHNOLOGIST PROVIDED HISTORY: Reason for exam:->Injury Reason for Exam: Left wrist injury, lateral pain Acuity: Acute Type of Exam: Initial FINDINGS: There is negative ulnar variance. There is narrowing of the radiocarpal joint. No acute fracture or dislocation noted. No significant degenerative changes otherwise noted. No acute osseous abnormality. PROCEDURES   Unless otherwise noted below, none     Procedures    CRITICAL CARE TIME   N/A    CONSULTS:  None      EMERGENCY DEPARTMENT COURSE and DIFFERENTIALDIAGNOSIS/MDM:   Vitals:    Vitals:    03/08/20 1330   BP: (!) 146/97   Pulse: 85   Resp: 16   Temp: 98.7 °F (37.1 °C)   SpO2: 100%   Weight: 198 lb (89.8 kg)   Height: 5' 4\" (1.626 m)       Patient was given thefollowing medications:  Medications - No data to display    Patient is a 43-year-old female who presents for evaluation of wrist injury. On exam she is alert oriented afebrile well-perfused, mildly hypertensive but with otherwise normal vital signs. She appears well overall. She has some point tenderness to the left wrist specifically at the ulna, no snuffbox tenderness. She has intact range of motion is distally neurovascularly intact with intact strength. X-rays reviewed, no evidence of acute fracture or dislocation. Given her point tenderness will put her in a wrist brace Pt advised that negative X-Ray today does not rule out occult fracture and She was provided with referral for orthopedics consult and instructed to see Her PCP if new/worsening symptoms or failure to improve. Based on patient's clinical history and clinical findings I currently estimate there is low probability for: compartment syndrome, DVT, septic arthritis, dislocation, surgically emergent tendon or NV injury.  We have

## 2020-03-08 NOTE — ED NOTES
Ambulatory from department without difficulty. No distress noted. Pt instructed to follow up with family doctor or doctor referred by ED physician. Pt also given number to the Pt advocate. Pt reports no further needs or questions prior to discharge. VELCRO SPLINT APPLIED .  GOOD rom, pms AFTER SPLINT APPLIED     Juan Miguel Hay RN  03/08/20 [id number]

## 2021-03-16 ENCOUNTER — HOSPITAL ENCOUNTER (EMERGENCY)
Age: 46
Discharge: HOME | End: 2021-03-16
Payer: COMMERCIAL

## 2021-03-16 VITALS
OXYGEN SATURATION: 97 % | DIASTOLIC BLOOD PRESSURE: 89 MMHG | SYSTOLIC BLOOD PRESSURE: 157 MMHG | TEMPERATURE: 97.6 F | HEART RATE: 98 BPM | RESPIRATION RATE: 16 BRPM

## 2021-03-16 DIAGNOSIS — L03.211 FACIAL CELLULITIS: Primary | ICD-10-CM

## 2021-03-16 PROCEDURE — 6370000000 HC RX 637 (ALT 250 FOR IP): Performed by: NURSE PRACTITIONER

## 2021-03-16 PROCEDURE — 99283 EMERGENCY DEPT VISIT LOW MDM: CPT

## 2021-03-16 RX ORDER — FLUOXETINE HYDROCHLORIDE 40 MG/1
40 CAPSULE ORAL DAILY
COMMUNITY

## 2021-03-16 RX ORDER — DOXYCYCLINE HYCLATE 100 MG
100 TABLET ORAL ONCE
Status: COMPLETED | OUTPATIENT
Start: 2021-03-16 | End: 2021-03-16

## 2021-03-16 RX ORDER — FLUCONAZOLE 150 MG/1
150 TABLET ORAL ONCE
Qty: 1 TABLET | Refills: 1 | Status: SHIPPED | OUTPATIENT
Start: 2021-03-16 | End: 2021-03-16

## 2021-03-16 RX ORDER — DOXYCYCLINE 100 MG/1
100 TABLET ORAL 2 TIMES DAILY
Qty: 28 TABLET | Refills: 0 | Status: SHIPPED | OUTPATIENT
Start: 2021-03-16 | End: 2021-03-30

## 2021-03-16 RX ADMIN — DOXYCYCLINE HYCLATE 100 MG: 100 TABLET, COATED ORAL at 19:17

## 2021-03-16 ASSESSMENT — PAIN SCALES - GENERAL
PAINLEVEL_OUTOF10: 2
PAINLEVEL_OUTOF10: 5

## 2021-03-16 NOTE — ED NOTES
Pt scripts x2 sent to Pharmacy of choice. Pt instructed to follow up with PCP. Assessed per Akshat Garces NP.      Lam López LPN  08/13/90 [id number]

## 2021-03-19 NOTE — ED PROVIDER NOTES
[facility]  ED  EMERGENCY DEPARTMENT ENCOUNTER      This patient was not seen and evaluated by the attending physician. Pt Name: Hector Roberts  MRN: [id number]  Renettagfmarialuisa 1975  Date of evaluation: 3/16/2021  Provider: RENA Cain  PCP: RENA Pederson CNP      History provided by the patient    CHIEFCOMPLAINT:     Chief Complaint   Patient presents with    Facial Pain     right jaw swelling and redness       HISTORY OF PRESENT ILLNESS:      Hector Roberts is a 39 y.o. female who presents to [facility]  ED with complaints of right jaw pain and swelling. Patient states she woke up and noticed some redness and swelling on chin which she thought was a pimple, states that it has spread. No fevers. She is here for further evaluation. LOCATION:chin  QUALITY:ache  SEVERITY:3  DURATION:today  MODIFYING FACTORS:none noted    Nursing Notes were reviewed     REVIEW OF SYSTEMS:     Review of Systems  All systems, a total of 10, are reviewed and negative except for those that were just noted in history present illness.         PAST MEDICAL HISTORY:     Past Medical History:   Diagnosis Date    Diabetes mellitus ([address]     Warm antibody hemolytic anemia 2018         SURGICAL HISTORY:      Past Surgical History:   Procedure Laterality Date    BREAST SURGERY  10/06    bilat breast augmentation    BRONCHOSCOPY  2017    EBUS and Bronch for Abnormal CT Scan     SECTION  2009    x2  and     COLONOSCOPY  14    LASIK      LEEP      dysplasia    NASAL SEPTUM SURGERY      TONSILLECTOMY AND ADENOIDECTOMY  1991    WISDOM TOOTH EXTRACTION           CURRENT MEDICATIONS:       Discharge Medication List as of 3/16/2021  7:26 PM      CONTINUE these medications which have NOT CHANGED    Details   FLUoxetine (PROZAC) 40 MG capsule Take 40 mg by mouth dailyHistorical Med      lisdexamfetamine (VYVANSE) 70 MG capsule Take 70 mg by mouth every morning . Historical Med      metFORMIN (GLUCOPHAGE) 1000 MG tablet Take 1,000 mg by mouth 2 times daily (with meals).                ALLERGIES:    Morphine, Vicoprofen [hydrocodone-ibuprofen], and Doxycycline hyclate    FAMILY HISTORY:       Family History   Problem Relation Age of Onset    Diabetes Sister     Cancer Maternal Uncle         metastatic malig of unknown primary    Diabetes Paternal Uncle     Cancer Paternal Grandfather         prostate    Cancer Maternal Uncle         colon          SOCIAL HISTORY:     Social History     Socioeconomic History    Marital status:      Spouse name: Not on file    Number of children: Not on file    Years of education: Not on file    Highest education level: Not on file   Occupational History    Not on file   Social Needs    Financial resource strain: Not on file    Food insecurity     Worry: Not on file     Inability: Not on file    Transportation needs     Medical: Not on file     Non-medical: Not on file   Tobacco Use    Smoking status: Former Smoker     Packs/day: 0.25     Quit date: 6/20/2010     Years since quitting: 10.7    Smokeless tobacco: Never Used   Substance and Sexual Activity    Alcohol use: Yes     Comment: rarely    Drug use: No    Sexual activity: Yes     Partners: Male   Lifestyle    Physical activity     Days per week: Not on file     Minutes per session: Not on file    Stress: Not on file   Relationships    Social connections     Talks on phone: Not on file     Gets together: Not on file     Attends Anabaptism service: Not on file     Active member of club or organization: Not on file     Attends meetings of clubs or organizations: Not on file     Relationship status: Not on file    Intimate partner violence     Fear of current or ex partner: Not on file     Emotionally abused: Not on file     Physically abused: Not on file     Forced sexual activity: Not on file   Other Topics Concern    me.  Formal interpretations per the radiologist are as follows: No orders to display           EKG:  See EKG interpretation by an attending physician. PROCEDURES:   N/A    CRITICAL CARE TIME:   N/A    CONSULTS:  None      EMERGENCY DEPARTMENT COURSE andDIFFERENTIAL DIAGNOSIS/MDM:   Vitals:    Vitals:    03/16/21 1831 03/16/21 1919   BP: (!) 178/113 (!) 157/89   Pulse: 98    Resp: 16    Temp: 97.6 °F (36.4 °C)    TempSrc: Axillary    SpO2: 97%        Patient wasgiven the following medications:  Medications   doxycycline hyclate (VIBRA-TABS) tablet 100 mg (100 mg Oral Given 3/16/21 1917)         Patient was evaluated independently by myself with the attending physician available for consultation. patient presented to the ER with complaints of swelling and redness to her chin. No fluctuance. Area is wam and tender to touch, slight induration but no fluctuance. Patient started on oral antibiotics. Patient had no evidence of abscess or ludqigs angina. Discharged in good condition. Patient laboratory studies, radiographic imaging, and assessment were all discussed with the patient and/orpatient family. There was shared decision-making between myself as well as the patient and/or their surrogate and we are all in agreement with discharge home. There was an opportunity for questions and all questions were answered tothe best of my ability and to the satisfaction of the patient and/or patient family. FINAL IMPRESSION:      1.  Facial cellulitis          DISPOSITION/PLAN:   DISPOSITION Decision To Discharge      PATIENT REFERRED TO:  Edra Olszewski, APRN - CNP  [address] [address]   [phone]    Call   For follow up    [facility]  ED  [facility] [address] [phone]  Go to   If symptoms worsen      DISCHARGE MEDICATIONS:  Discharge Medication List as of 3/16/2021  7:26 PM      START taking these medications    Details   doxycycline monohydrate (ADOXA) 100 MG tablet Take 1 tablet by mouth 2 times daily for 14 days, Disp-28 tablet, R-0Normal      fluconazole (DIFLUCAN) 150 MG tablet Take 1 tablet by mouth once for 1 dose, Disp-1 tablet, R-1Normal                        (Please note thatportions of this note were completed with a voice recognition program.  Efforts were made to edit the dictations, but occasionally words are mis-transcribed.)    RENA Davis CNP-C (electronicallysigned)        RENA Davis CNP  03/18/21 [id number]

## 2024-09-03 NOTE — ED PROVIDER NOTES
Spoke with patient, she stated that she started to have pain in her back near her kidneys starting yesterday. It started on the left side and is moving towards her back. She rates her pain a 7 or 8 out of 10. Patient has not took any pain medication. She states she also has urinary frequency more than usual along with burning with urination. Patient denies urinary urgency or blood in her urine. Patient states she is unsure if she has a fever as she does not own a thermometer. She denies chills, nausea, vomiting. She does not have a history of kidney stones. Patient states that she is taking her Trospium. Patient in agreement to do a urine sample at an [place] lab near her to see if a UTI is causing her symptoms. Order placed. Patient has no further questions or concerns.              APPEARANCE: Awake and alert. Cooperative. No acute distress. She is able to sit for examination. HEAD: Normocephalic. Atraumatic. EYES: EOM's grossly intact. Sclera anicteric. PEERL  ENT: Mucous membranes are moist. Tolerates saliva. No trismus. NECK: Supple. No meningismus. Trachea midline. No JVD. No bruits. HEART: RRR. Radial pulses 2+. Heart without murmur. LUNGS: Respirations unlabored. CTAB  ABDOMEN: Soft. Non-tender. No guarding or rebound. No CVAT. EXTREMITIES: No acute deformities. Hand  are equal.  No joint swelling. No lower extremity calf pain or edema. SKIN: Warm and dry. NEUROLOGICAL: No gross facial drooping. GCS 15. Cranial nerves intact. DTRs are equal.  Moves all 4 extremities spontaneously. No focal motor or sensory abnormalities of the upper or lower extremities. PSYCHIATRIC: Normal mood. I have reviewed and interpreted all of the currently available lab results from this visit (if applicable):  No results found for this visit on 08/18/18. Radiographs (if obtained):  [] The following radiograph was interpreted by myself in the absence of a radiologist:  [] Radiologist's Report Reviewed:      EKG (if obtained): (All EKG's are interpreted by myself in the absence of a cardiologist)    MDM:  She is placed in room and examined. She is afebrile. Vitals are normal.  She is calling this a migraine headache this headache is more like a muscular headache. Headache is begun in the support muscles of her left posterior neck. Seems to radiate up into the scalp. She is given Reglan and Benadryl. Headache is significantly improved. She has no focal findings on neurological exam.  Of consider the possibility of meningitis and/or subarachnoid hemorrhage and feel it would be extremely unlikely for either of these entities to be occurring. Her with a prescription for Fioricet. I recommend heat to her neck. I recommend follow-up with her PCP.   She is in stable condition on